# Patient Record
Sex: FEMALE | Race: WHITE | NOT HISPANIC OR LATINO | Employment: OTHER | URBAN - METROPOLITAN AREA
[De-identification: names, ages, dates, MRNs, and addresses within clinical notes are randomized per-mention and may not be internally consistent; named-entity substitution may affect disease eponyms.]

---

## 2017-12-21 ENCOUNTER — ALLSCRIPTS OFFICE VISIT (OUTPATIENT)
Dept: OTHER | Facility: OTHER | Age: 42
End: 2017-12-21

## 2017-12-21 ENCOUNTER — TRANSCRIBE ORDERS (OUTPATIENT)
Dept: MAMMOGRAPHY | Facility: CLINIC | Age: 42
End: 2017-12-21

## 2017-12-21 ENCOUNTER — HOSPITAL ENCOUNTER (OUTPATIENT)
Dept: MAMMOGRAPHY | Facility: CLINIC | Age: 42
Discharge: HOME | End: 2017-12-21
Payer: COMMERCIAL

## 2017-12-21 DIAGNOSIS — Z12.31 ENCOUNTER FOR SCREENING MAMMOGRAM FOR MALIGNANT NEOPLASM OF BREAST: ICD-10-CM

## 2017-12-21 PROCEDURE — 77063 BREAST TOMOSYNTHESIS BI: CPT

## 2017-12-21 PROCEDURE — G0202 SCR MAMMO BI INCL CAD: HCPCS

## 2017-12-26 ENCOUNTER — ALLSCRIPTS OFFICE VISIT (OUTPATIENT)
Dept: OTHER | Facility: OTHER | Age: 42
End: 2017-12-26

## 2017-12-26 ENCOUNTER — GENERIC CONVERSION - ENCOUNTER (OUTPATIENT)
Dept: OTHER | Facility: OTHER | Age: 42
End: 2017-12-26

## 2017-12-27 NOTE — PROGRESS NOTES
Assessment   1  Never a smoker   2  Acute upper respiratory infection (465 9) (J06 9)   3  Cough (786 2) (R05)    Plan   Acute upper respiratory infection    · Mucinex DM  MG Oral Tablet Extended Release 12 Hour; TAKE 1 TO 2    TABLETS EVERY 12 HOURS AS NEEDED FOR COUGH   · Saline Nasal Spray 0 65 % Nasal Solution; 2 spray each nostril twice a day   · PredniSONE 10 MG Oral Tablet; 4 pills at once daily with food for 2 days, 3 pills at    once daily for 2 days, 2 pills daily at once  for 2 days, one pill daily for 2 days  Cough    · PEAK FLOW- POC; Status:Resulted - Requires Verification,Retrospective By Protocol    Authorization;   Done: [id number] 04:05PM    Discussion/Summary      URI - start Mucinex and Prednisone taper and use saline nasal spray, f/u in the office if symptoms persist or worsen  Possible side effects of new medications were reviewed with the patient/guardian today  The treatment plan was reviewed with the patient/guardian  The patient/guardian understands and agrees with the treatment plan      Chief Complaint   Pt presents in the office today with c/o a cough and congestion times 5 days;   due 12/21/2018 due 06/27/2018      History of Present Illness   HPI: Pt presents with c/o nasal congestion, runny nose, postnasal drip, cough and chest congestion for 5 days, she is now having tightness in her chest with activity or coughing spells and has been using her Mother's rescue inhaler 1-2 times a day with good results  No fever or chills, no purulent mucus production, no pleuritic chest pain  Review of Systems        Constitutional: feeling tired, but-- no fever,-- not feeling poorly-- and-- no chills  ENT: nasal discharge, but-- no earache,-- no sore throat-- and-- no hoarseness  Cardiovascular: no complaints of slow or fast heart rate, no chest pain, no palpitations, no leg claudication or lower extremity edema  Respiratory: as noted in HPI        Gastrointestinal: no complaints of abdominal pain, no constipation, no nausea or diarrhea, no vomiting, no bloody stools  Active Problems   1  Acute upper respiratory infection (465 9) (J06 9)   2  Allergic rhinitis (477 9) (J30 9)   3  Bee sting allergy (V15 06) (Z91 038)   4  Blood tests for routine general physical examination (V72 62) (Z00 00)   5  Cough (786 2) (R05)   6  Encounter for routine gynecological examination (V72 31) (Z01 419)   7  Encounter for screening for other disorder (V82 89) (Z13 89)   8  Encounter for screening mammogram for malignant neoplasm of breast (V76 12)     (Z12 31)   9  Need for prophylactic vaccination and inoculation against influenza (V04 81) (Z23)   10  Need for vaccination for DTP (V06 1) (Z23)   11  Pap smear abnormality of cervix with ASCUS favoring benign (795 01) (R87 610)   12  Thyroid nodule (241 0) (E04 1)   13  Tick bite (919 4,E906 4) (W57 XXXA)   14  Vitamin D deficiency (268 9) (E55 9)    Past Medical History   1  History of Contact dermatitis due to poison ivy (692 6) (L23 7)   2  History of Dermatitis Due To Contact With Poison [place] (692 6)   3  History of acute bronchitis (V12 69) (Z87 09)   4  History of tonsillitis (V12 69) (Z87 09)    Family History   Mother    1  Denied: Family history of drug abuse   2  Family history of Healthy adult   3  Family history of Living and Healthy   4  Denied: Family history of Mental health problem  Father    5  Denied: Family history of drug abuse   6  FHx: Parkinson's disease (V17 2) (Z82 0)   7  Family history of Living and Healthy   8  Denied: Family history of Mental health problem  Maternal Grandfather    9  FHx: Parkinson's disease (V17 2) (Z82 0)   10  Family history of Healthy adult  Family History Reviewed: The family history was reviewed and updated today         Social History    · Denied: History of Alcohol use   · Always uses seat belt   · Caffeine use (V49 89) (F15 90)   · 1 cup coffee   · Daily caffeine consumption, 1 serving a day   · Has smoke detectors   · Never a smoker   · No alcohol use   · No drug use  The social history was reviewed and updated today  Surgical History   1  History of Arm Incision    Current Meds    1  EpiPen 2-Richardson 0 3 MG/0 3ML Injection Solution Auto-injector; Therapy: [id number] to Recorded   2  [address] [place] [address]; Therapy: (Recorded:12Nwy2658) to Recorded    Allergies   1  No Known Drug Allergies  2  Other    Vitals    Recorded: 34VJC3609 04:01PM   Temperature 98 F   Heart Rate 64   Respiration 14   Systolic 751   Diastolic 64   Height 5 ft 5 in   Weight 146 lb 3 2 oz   BMI Calculated 24 33   BSA Calculated 1 73     Physical Exam        Constitutional      General appearance: No acute distress, well appearing and well nourished  Ears, Nose, Mouth, and Throat      Otoscopic examination: Tympanic membranes translucent with normal light reflex  Canals patent without erythema  Nasal mucosa, septum, and turbinates: Abnormal   no nasal discharge  The bilateral nasal mucosa was edematous-- and-- red  Oropharynx: Normal with no erythema, edema, exudate or lesions  Pulmonary      Respiratory effort: No increased work of breathing or signs of respiratory distress  Auscultation of lungs: Abnormal   no rales or crackles were heard bilaterally  rhonchi over both apices-- and-- cleared with cough  no wheezing  Cardiovascular      Auscultation of heart: Normal rate and rhythm, normal S1 and S2, without murmurs  Lymphatic      Palpation of lymph nodes in neck: No lymphadenopathy            Results/Data   PEAK FLOW- POC [id number] 04:05PM Kym[facility]      Test Name Result Flag Reference   Peak Flow [phone]                      Signatures    Electronically signed by : TODD Parikh ; Dec 26 2017  6:55PM EST                       (Author)

## 2017-12-29 LAB
ADEQUACY: (HISTORICAL): NORMAL
CLINICIAN PROVIDIED ICD 9 OR 10 (HISTORICAL): NORMAL
COMMENT (HISTORICAL): NORMAL
DIAGNOSIS (HISTORICAL): NORMAL
PERFORMED BY (HISTORICAL): NORMAL
REFLEX (HISTORICAL): NORMAL

## 2018-01-09 NOTE — PROGRESS NOTES
Assessment    1  Encounter for routine gynecological examination (V72 31) (Z01 419)    Plan  Encounter for routine gynecological examination    · (1) THIN PREP PAP WITH IMAGING; Status: In Progress - Specimen/Data Collected;    Done: 17SKW7106  Maturation index required? : No  : 06/20/2016  HPV? : if ASCUS   · Follow Up in 2 Years Evaluation and Treatment  Follow-up  Status: Complete  Done:  39JQY1688  Tick bite    · (1) LYME ANTIBODY, WESTERN BLOT; Status:Canceled;            A/P  1  gyn:  we have done your pap and we will put a card in the mail with the results  rto 2 years or as needed    Mammogram is due after 08/04/2015  and script was given  Pap test is due after 08/04/2016 and she has a new insurance plan  Chief Complaint  Patient presents to the office today for annual GYN  Mammogram is due after 08/04/2015  Pap test is due after 08/04/2016 and she has a new insurance plan  History of Present Illness  HM, Adult Female: The patient is being seen for a gynecology evaluation  General Health:   Reproductive health:  she reports normal menses  Menstrual history:  age at menarche was 13years old  LMP: the last menstrual period was 06/20/2016  Recent menstrual periods: bleeding has been normal  The cycles have been regular  The duration of her recent periods has been regular  Screening:   HPI: Here today for annual pap  menses are normal no issues  mammo i is due and script was given         Review of Systems    Constitutional: No fever, no chills, feels well, no tiredness, no recent weight gain or weight loss  Cardiovascular: No complaints of slow heart rate, no fast heart rate, no chest pain, no palpitations, no leg claudication, no lower extremity edema  Respiratory: No complaints of shortness of breath, no wheezing, no cough, no SOB on exertion, no orthopnea, no PND     Gastrointestinal: No complaints of abdominal pain, no constipation, no nausea or vomiting, no diarrhea, no bloody stools  Genitourinary: No complaints of dysuria, no incontinence, no pelvic pain, no dysmenorrhea, no vaginal discharge or bleeding  Musculoskeletal: No complaints of arthralgias, no myalgias, no joint swelling or stiffness, no limb pain or swelling  Integumentary: No complaints of skin rash or lesions, no itching, no skin wounds, no breast pain or lump  Active Problems    1  Allergic reaction (995 3) (T78 40XA)   2  Allergic rhinitis (477 9) (J30 9)   3  Bee sting allergy (V15 06) (Z91 038)   4  Blood tests for routine general physical examination (V72 62) (Z00 00)   5  Encounter for routine gynecological examination (V72 31) (Z01 419)   6  Encounter for screening mammogram for malignant neoplasm of breast (V76 12)   (Z12 31)   7  Need for prophylactic vaccination and inoculation against influenza (V04 81) (Z23)   8  Need for vaccination for DTP (V06 1) (Z23)   9  Pap smear abnormality of cervix with ASCUS favoring benign (795 01) (R87 610)   10  Tick bite (919 4,E906 4) (W57 XXXA)   11   Vitamin D deficiency (268 9) (E55 9)    Past Medical History    · History of Contact dermatitis due to poison ivy (692 6) (L23 7)   · History of Dermatitis Due To Contact With Poison [place] (692 6)   · History of acute bronchitis (V12 69) (Z87 09)   · History of tonsillitis (V12 69) (Z87 09)   · History of Sore throat (462) (J02 9)    Surgical History    · History of Arm Incision    Family History  Mother    · Family history of Healthy adult   · Family history of Living and Healthy  Father    · FHx: Parkinson's disease (V17 2) (Z82 0)   · Family history of Living and Healthy  Maternal Grandfather    · FHx: Parkinson's disease (V17 2) (Z82 0)   · Family history of Healthy adult    Social History    · Denied: History of Alcohol use   · Always uses seat belt   · Caffeine use (V49 89) (F15 90)   · Daily caffeine consumption, 1 serving a day   · Has smoke detectors   · Never a smoker   · No alcohol use   · No drug use    Current Meds   1  EpiPen 2-Richardson 0 3 MG/0 3ML Injection Solution Auto-injector; Therapy: 11KID1782 to Recorded   2  [address] [facility]; Therapy: (Recorded:30Oct2015) to Recorded    Allergies    1  No Known Drug Allergies    2  Other    Vitals   Recorded: 73CSK6707 01:48PM   Heart Rate 64   Respiration 14   Systolic 991   Diastolic 62   Height 5 ft 5 in   Weight 137 lb    BMI Calculated 22 8   BSA Calculated 1 69     Physical Exam    Constitutional   General appearance: No acute distress, well appearing and well nourished  Neck   Neck: Supple, symmetric, trachea midline, no masses  Pulmonary   Auscultation of lungs: Clear to auscultation  Cardiovascular   Auscultation of heart: Normal rate and rhythm, normal S1 and S2, no murmurs  Chest   Breasts: Normal, no dimpling or skin changes appreciated  Palpation of breasts and axillae: Normal, no masses palpated  Abdomen   Abdomen: Non-tender, no masses  Liver and spleen: No hepatomegaly or splenomegaly  Genitourinary   External genitalia and vagina: Normal, no lesions appreciated  Cervix: Normal, no lesions, Pap obtained  Uterus: Normal size, no tenderness, no masses  Adnexa/Parametria: Normal, no masses or tenderness  Lymphatic   Palpation of lymph nodes in neck: No lymphadenopathy  Musculoskeletal   Range of motion: Normal     Skin   Skin and subcutaneous tissue: Normal without rashes or lesions  Psychiatric   Orientation to person, place, and time: Normal     Mood and affect: Normal        Health Management  Encounter for routine gynecological examination   (every) THINPREP PAP AND HR HPV DNA; every 2 years; Last 04Aug2014; Next Due:  78Koh9078; Near Due  Encounter for screening mammogram for malignant neoplasm of breast   Digital Bilateral Screening Mammogram With CAD; every 1 year; Last 04Aug2014; Next  Due: 66Kbp7947;  Overdue    Signatures   Electronically signed by : HUSSAIN Valverde; Jun 27 2016  2:29PM EST (Author)    Electronically signed by : TODD Ordonez ; Jun 27 2016  3:17PM EST                       (Author)

## 2018-01-23 VITALS
HEART RATE: 64 BPM | SYSTOLIC BLOOD PRESSURE: 104 MMHG | DIASTOLIC BLOOD PRESSURE: 64 MMHG | HEIGHT: 65 IN | RESPIRATION RATE: 14 BRPM | BODY MASS INDEX: 24.36 KG/M2 | WEIGHT: 146.2 LBS | TEMPERATURE: 98 F

## 2018-01-23 VITALS
SYSTOLIC BLOOD PRESSURE: 114 MMHG | DIASTOLIC BLOOD PRESSURE: 80 MMHG | BODY MASS INDEX: 23.16 KG/M2 | RESPIRATION RATE: 14 BRPM | WEIGHT: 139 LBS | HEART RATE: 66 BPM | HEIGHT: 65 IN

## 2018-01-23 NOTE — PROGRESS NOTES
Assessment    1  Encounter for routine gynecological examination (V72 31) (Z01 419)    Plan  Encounter for routine gynecological examination    · (1) THIN PREP PAP WITH IMAGING; Status: In Progress - Specimen/Data  Collected,Retrospective By Protocol Authorization;   Done: 21KFL8023  Maturation index required? : No  HPV? : if ASCUS  Encounter for screening mammogram for malignant neoplasm of breast    · * MAMMO SCREENING BILATERAL W CAD; Status:Active - Retrospective By Protocol  Authorization; Requested for:[id number];     a/p  1 GYN: WE HAVE DONE YOUR PAP TODAY AND WE WILL PUT A CARD IN THE  MAIL WITH THE RESULTS  YOU WILL BE DUE BACK AGAIN IN 2 YEARS    almita due - 08/2016   SCRIPT WAS GIVEN   pap due - 12/2019     Chief Complaint  pt  presents in the office today due to a pap    almita due - 08/2016  pap due - 06/27/2018      History of Present Illness  HM, Adult Female: The patient is being seen for a gynecology evaluation  General Health:   Reproductive health:  she reports abnormal menses  Menstrual history:  age at menarche was 13  LMP: it was of a normal amount and duration  Recent menstrual periods: bleeding has been normal  The cycles have been regular  The duration of her recent periods has been regular  Screening:   HPI: here today for annual pap      Review of Systems    Constitutional: No fever, no chills, feels well, no tiredness, no recent weight gain or weight loss  Cardiovascular: No complaints of slow heart rate, no fast heart rate, no chest pain, no palpitations, no leg claudication, no lower extremity edema  Respiratory: No complaints of shortness of breath, no wheezing, no cough, no SOB on exertion, no orthopnea, no PND  Gastrointestinal: No complaints of abdominal pain, no constipation, no nausea or vomiting, no diarrhea, no bloody stools  Genitourinary: No complaints of dysuria, no incontinence, no pelvic pain, no dysmenorrhea, no vaginal discharge or bleeding     Neurological: No complaints of headache, no confusion, no convulsions, no numbness, no dizziness or fainting, no tingling, no limb weakness, no difficulty walking  Psychiatric: Not suicidal, no sleep disturbance, no anxiety or depression, no change in personality, no emotional problems  Endocrine: No complaints of proptosis, no hot flashes, no muscle weakness, no deepening of the voice, no feelings of weakness  Active Problems    1  Allergic rhinitis (477 9) (J30 9)   2  Bee sting allergy (V15 06) (Z91 038)   3  Blood tests for routine general physical examination (V72 62) (Z00 00)   4  Encounter for routine gynecological examination (V72 31) (Z01 419)   5  Encounter for screening for other disorder (V82 89) (Z13 89)   6  Encounter for screening mammogram for malignant neoplasm of breast (V76 12)   (Z12 31)   7  Need for prophylactic vaccination and inoculation against influenza (V04 81) (Z23)   8  Need for vaccination for DTP (V06 1) (Z23)   9  Pap smear abnormality of cervix with ASCUS favoring benign (795 01) (R87 610)   10  Thyroid nodule (241 0) (E04 1)   11  Tick bite (919 4,E906 4) (W57 XXXA)   12   Vitamin D deficiency (268 9) (E55 9)    Past Medical History    · History of Contact dermatitis due to poison ivy (692 6) (L23 7)   · History of Dermatitis Due To Contact With Poison [place] (692 6)   · History of acute bronchitis (V12 69) (Z87 09)   · History of tonsillitis (V12 69) (Z87 09)    Surgical History    · History of Arm Incision    Family History  Mother    · Denied: Family history of drug abuse   · Family history of Healthy adult   · Family history of Living and Healthy   · Denied: Family history of Mental health problem  Father    · Denied: Family history of drug abuse   · FHx: Parkinson's disease (V17 2) (Z82 0)   · Family history of Living and Healthy   · Denied: Family history of Mental health problem  Maternal Grandfather    · FHx: Parkinson's disease (V17 2) (Z82 0)   · Family history of Healthy adult    Social History    · Denied: History of Alcohol use   · Always uses seat belt   · Caffeine use (V49 89) (F15 90)   · 1 cup coffee   · Daily caffeine consumption, 1 serving a day   · Has smoke detectors   · Never a smoker   · No alcohol use   · No drug use    Current Meds   1  EpiPen 2-Richardson 0 3 MG/0 3ML Injection Solution Auto-injector; Therapy: 01XCT2064 to Recorded   2  [address] [facility]; Therapy: (Recorded:30Oct2015) to Recorded    Allergies    1  No Known Drug Allergies    2  Other    Vitals   Recorded: 21Dec2017 11:31AM   Heart Rate 66   Respiration 14   Systolic 702   Diastolic 80   Height 5 ft 5 in   Weight 139 lb    BMI Calculated 23 13   BSA Calculated 1 69     Physical Exam    Constitutional   General appearance: No acute distress, well appearing and well nourished  Neck   Neck: Supple, symmetric, trachea midline, no masses  Thyroid: Normal, no thyromegaly  Pulmonary   Auscultation of lungs: Clear to auscultation  Cardiovascular   Auscultation of heart: Normal rate and rhythm, normal S1 and S2, no murmurs  Carotid pulses: 2+ bilaterally  Chest   Breasts: Normal, no dimpling or skin changes appreciated  Palpation of breasts and axillae: Normal, no masses palpated  Abdomen   Abdomen: Non-tender, no masses  Liver and spleen: No hepatomegaly or splenomegaly  Genitourinary   External genitalia and vagina: Normal, no lesions appreciated  Cervix: Normal, no lesions, Pap obtained  Examination of the cervix revealed normal findings  A Pap smear was performed  Uterus: Normal size, no tenderness, no masses  Adnexa/Parametria: Normal, no masses or tenderness  Lymphatic   Palpation of lymph nodes in neck: No lymphadenopathy  Skin   Skin and subcutaneous tissue: Normal without rashes or lesions      Psychiatric   Orientation to person, place, and time: Normal     Mood and affect: Normal        Results/Data  PHQ-2 Adult Depression Screening 84Xgs3025 11:33AM User, s Test Name Result Flag Reference   PHQ-2 Adult Depression Score 0     Over the last two weeks, how often have you been bothered by any of the following problems?   Little interest or pleasure in doing things: Not at all - 0  Feeling down, depressed, or hopeless: Not at all - 0   PHQ-2 Adult Depression Screening Negative         Signatures   Electronically signed by : HUSSAIN Britton; Dec 21 2017 12:24PM EST                       (Author)    Electronically signed by : Joshua Davies DO; Dec 22 2017  8:22AM EST

## 2018-04-02 ENCOUNTER — OFFICE VISIT (OUTPATIENT)
Dept: FAMILY MEDICINE CLINIC | Facility: CLINIC | Age: 43
End: 2018-04-02
Payer: COMMERCIAL

## 2018-04-02 VITALS
SYSTOLIC BLOOD PRESSURE: 120 MMHG | RESPIRATION RATE: 12 BRPM | DIASTOLIC BLOOD PRESSURE: 62 MMHG | BODY MASS INDEX: 23.82 KG/M2 | HEIGHT: 65 IN | HEART RATE: 88 BPM | WEIGHT: 143 LBS

## 2018-04-02 DIAGNOSIS — G44.029 CHRONIC CLUSTER HEADACHE, NOT INTRACTABLE: Primary | ICD-10-CM

## 2018-04-02 DIAGNOSIS — R51.9 HEADACHE DISORDER: ICD-10-CM

## 2018-04-02 PROBLEM — Z00.00 HEALTHCARE MAINTENANCE: Status: ACTIVE | Noted: 2018-04-02

## 2018-04-02 PROCEDURE — 99214 OFFICE O/P EST MOD 30 MIN: CPT | Performed by: NURSE PRACTITIONER

## 2018-04-02 PROCEDURE — 3008F BODY MASS INDEX DOCD: CPT | Performed by: NURSE PRACTITIONER

## 2018-04-02 RX ORDER — EPINEPHRINE 0.3 MG/.3ML
INJECTION SUBCUTANEOUS
COMMUNITY
Start: 2014-08-17

## 2018-04-02 RX ORDER — ECHINACEA PURPUREA EXTRACT 125 MG
2 TABLET ORAL 2 TIMES DAILY
COMMUNITY
Start: 2017-12-26 | End: 2018-04-02 | Stop reason: ALTCHOICE

## 2018-04-02 RX ORDER — BIOTIN 5 MG
TABLET ORAL
COMMUNITY

## 2018-04-02 NOTE — PROGRESS NOTES
Chief Complaint   Patient presents with    Migraine     Assessment/Plan:      1  Chronic cluster headache, not intractable  I have ordered labs and a ct of your head  I will call you with the results  If these are normal I will refer you to neuro for evaluation  Please wait for insurance  approval prior to scheduling the CT scan  - CT head wo contrast; Future  - TSH, 3rd generation with T4 reflex; Future  - Comprehensive metabolic panel; Future  - CBC and differential; Future  - Lyme disease, western blot; Future          Subjective:      Patient ID: John Sutton is a 38 y o  female  Here today with concern regarding migraines  / father and sister both get migraines/ pt states she got her first one about one year ago after a spinning class  Does not get them very often but seem to occur if she is dehydrated Does not always get a headache but will get other symptoms like her face feels swollen, gets brain fog , feels things moving around in her head  Feels off  Feels that she looks pale  Has gotten dizzy with this  over the past couple days has been getting this every day  Yesterday was feeling like this was happening again and was feeling dizzy so she hate some fruit and this seemed to make her feel better  The headache part is a minor part and rare part of what she is calling a migraine  Once ws in a specific par to f her head  And was more like sharp but not throbbing  Ws more pressure  Other symptoms are " brain fog":   Can not concentrate  Gets sensitivity to light and then gets patches or blurryness  In her vision that is defiantly on the R but not sure on the L eye  In the last week has been getting this every day  Has gotten the visual disturbance almost every day in the past week           The following portions of the patient's history were reviewed and updated as appropriate: allergies, current medications, past family history, past medical history, past social history, past surgical history and problem list     Past Medical History:   Diagnosis Date    Broken arm     Migraine      History reviewed  No pertinent surgical history  Family History   Problem Relation Age of Onset    No Known Problems Mother     Parkinsonism Father     Migraines Father     Migraines Sister     Substance Abuse Neg Hx     Mental illness Neg Hx      Social History   Social History     Social History    Marital status: Single     Spouse name: N/A    Number of children: N/A    Years of education: N/A     Occupational History    Not on file  Social History Main Topics    Smoking status: Never Smoker    Smokeless tobacco: Never Used    Alcohol use No    Drug use: No    Sexual activity: Not on file     Other Topics Concern    Not on file     Social History Narrative    No narrative on file     Review of Systems   Constitutional: Negative  HENT: Negative  Eyes: Positive for photophobia and visual disturbance  Respiratory: Negative  Cardiovascular: Negative  Musculoskeletal: Negative  Skin: Negative  Neurological: Positive for dizziness and headaches  Psychiatric/Behavioral: Negative  Vitals:    04/02/18 1534   BP: 120/62   BP Location: Left arm   Patient Position: Sitting   Pulse: 88   Resp: 12   Weight: 64 9 kg (143 lb)   Height: 5' 5" (1 651 m)       Objective:  Generic Conversion - Encounter on 12/26/2017   Component Date Value Ref Range Status    DIAGNOSIS (HISTORICAL) 12/26/2017 NEGATIVE FOR INTRAEPITHELIAL LESION AND MALIGNANCY  THE CYTOLOGY PROCESSING WAS PERFORMED AT THE [facility] FACILITY LOCATED  [facility], [address], [address] [place] [phone]  Final    Comment: Result Comment: NEGATIVE FOR INTRAEPITHELIAL LESION AND MALIGNANCY  THE CYTOLOGY PROCESSING WAS PERFORMED AT THE [facility] FACILITY LOCATED AT  [facility], [address], [address] [place] [phone]   Adequacy: (HISTORICAL) 12/26/2017 Satisfactory for evaluation    Endocervical and/or squamous metaplasticcells (endocervical component) are present  Final    Comment: Result Comment: Satisfactory for evaluation  Endocervical and/or squamous metaplastic  cells (endocervical component) are present   CLINICIAN PROVIDIED ICD 9 OR 10 (H* 12/26/2017 Z01 419   Final    PERFORMED BY (HISTORICAL) 12/26/2017 Elba Hernandez Cytotechnologist (ASCP)   Final    Comment 12/26/2017   Final    NOTE: 12/26/2017    Final                    Value: The Pap smear is a screening test designed to aid in the detection ofpremalignant and malignant conditions of the uterine cervix  It is not adiagnostic procedure and should not be used as the sole means of detectingcervical cancer  Both false-positive   and false-negative reports do occur   Reflex (HISTORICAL) 12/26/2017 The HPV DNA reflex criteria were not met with this specimen resulttherefore, no HPV testing was performed  Final    Comment: Result Comment: The HPV DNA reflex criteria were not met with this specimen result  therefore, no HPV testing was performed  Performed at: [address] [place] [address],[address], [address], [id number], [id number]  MD:  [facility] [phone]     Generic Conversion - Encounter on 08/23/2016   Component Date Value Ref Range Status    CULTURE RESULT 08/23/2016 Final report   Final    Miscellaneous Lab Test Result 08/23/2016 Comment   Final    Comment: Result Comment: Routine respiratory pancho  Please indicate source of specimen on all future request forms  Performed at: [facility] , [place], [place], [id number], [id number]  MD:  [address] [place] [phone]     Generic Conversion - Encounter on 06/27/2016   Component Date Value Ref Range Status    DIAGNOSIS (HISTORICAL) 06/27/2016 Comment   Final    Comment: Result Comment: NEGATIVE FOR INTRAEPITHELIAL LESION AND MALIGNANCY  REACTIVE CELLULAR CHANGES AND/OR REPAIR ARE PRESENT    THE CYTOLOGY PROCESSING WAS PERFORMED AT THE [facility]AC[facility] LOCATED AT  [facility], [address], [address] [place], [place] [id number]   RECOMMENDATION (HISTORICAL) 06/27/2016 Comment   Final    Adequacy: (HISTORICAL) 06/27/2016 Comment   Final    Comment: Result Comment: Satisfactory for evaluation  Endocervical and/or squamous metaplastic  cells (endocervical component) are present   CLINICIAN PROVIDIED ICD 9 OR 10 (H* 06/27/2016 Comment   Final    PERFORMED BY (HISTORICAL) 06/27/2016 Comment   Final    ELECTRONICALLY SIGNED BY (HISTORIC* 06/27/2016 Comment   Final    Comment 06/27/2016   Final    NOTE: 06/27/2016 Comment   Final    Comment: Result Comment: The Pap smear is a screening test designed to aid in the detection of  premalignant and malignant conditions of the uterine cervix  It is not a  diagnostic procedure and should not be used as the sole means of detecting  cervical cancer  Both false-positive and false-negative reports do occur   Reflex (HISTORICAL) 06/27/2016 Comment   Final    Comment: Result Comment: The HPV DNA reflex criteria were not met with this specimen result  therefore, no HPV testing was performed  Performed at: [address] [place] [address],[address], [address], [id number], [id number]  MD:  [address] [facility] [phone]            Physical Exam   Constitutional: She is oriented to person, place, and time  She appears well-developed and well-nourished  HENT:   Head: Normocephalic and atraumatic  Left Ear: External ear normal    Eyes: Conjunctivae and EOM are normal  Pupils are equal, round, and reactive to light  Neck: Normal range of motion  Neck supple  No thyromegaly present  Cardiovascular: Normal rate, normal heart sounds and intact distal pulses  Pulmonary/Chest: Effort normal and breath sounds normal    Musculoskeletal: Normal range of motion  Neurological: She is alert and oriented to person, place, and time  She has normal reflexes  She displays normal reflexes  No cranial nerve deficit  Coordination normal    Good tandem gait, neg rhomberg neg drift   Skin: Skin is warm and dry  Psychiatric: She has a normal mood and affect   Her behavior is normal  Judgment and thought content normal

## 2018-04-03 ENCOUNTER — APPOINTMENT (EMERGENCY)
Dept: RADIOLOGY | Facility: HOSPITAL | Age: 43
End: 2018-04-03
Payer: COMMERCIAL

## 2018-04-03 ENCOUNTER — HOSPITAL ENCOUNTER (EMERGENCY)
Facility: HOSPITAL | Age: 43
Discharge: HOME | End: 2018-04-03
Attending: EMERGENCY MEDICINE | Admitting: EMERGENCY MEDICINE
Payer: COMMERCIAL

## 2018-04-03 VITALS
OXYGEN SATURATION: 100 % | DIASTOLIC BLOOD PRESSURE: 80 MMHG | HEIGHT: 65 IN | WEIGHT: 141 LBS | HEART RATE: 70 BPM | SYSTOLIC BLOOD PRESSURE: 124 MMHG | TEMPERATURE: 97.7 F | RESPIRATION RATE: 18 BRPM | BODY MASS INDEX: 23.49 KG/M2

## 2018-04-03 DIAGNOSIS — G43.909 MIGRAINE: Primary | ICD-10-CM

## 2018-04-03 PROCEDURE — 70450 CT HEAD/BRAIN W/O DYE: CPT

## 2018-04-03 PROCEDURE — 99283 EMERGENCY DEPT VISIT LOW MDM: CPT

## 2018-04-03 RX ORDER — MECLIZINE HCL 12.5 MG/1
12.5 TABLET ORAL 3 TIMES DAILY PRN
Qty: 30 TABLET | Refills: 0 | Status: SHIPPED | OUTPATIENT
Start: 2018-04-03 | End: 2018-12-27 | Stop reason: ALTCHOICE

## 2018-04-03 NOTE — DISCHARGE INSTRUCTIONS
Migraine Headache   WHAT YOU SHOULD KNOW:   A migraine is a severe headache  The pain can be so severe that it interferes with your daily activities  A migraine can last a few hours up to several days  The exact cause of migraines is not known  It may be caused by changes in your body chemicals and extra sensitive nerves in your brain  AFTER YOU LEAVE:   Medicines:  Take medicine as soon as you feel a migraine begin  · Pain medicine: You may need medicine to take away or decrease pain  You may need a doctor's order for this medicine  Do not wait until the pain is severe before you take your medicine  · Migraine medicines: These are used to help prevent a migraine or stop it once it starts  · Antinausea medicine: This medicine may be given to calm your stomach and to help prevent vomiting  They can also help relieve pain  · Take your medicine as directed  Call your healthcare provider if you think your medicine is not helping or if you have side effects  Tell him if you are allergic to any medicine  Keep a list of the medicines, vitamins, and herbs you take  Include the amounts, and when and why you take them  Bring the list or the pill bottles to follow-up visits  Carry your medicine list with you in case of an emergency  Manage your symptoms:   · Rest:  Rest in a dark, quiet room  This will help decrease your pain  · Ice:  Ice helps decrease pain  Use an ice pack or put crushed ice in a plastic bag  Cover the ice pack with a towel and place it on your head where it hurts for 15 to 20 minutes every hour  · Heat:  Heat helps decrease pain and muscle spasms  Use a small towel dampened with warm water or a heating pad, or sit in a warm bath  Apply heat on the area for 20 to 30 minutes every 2 hours  You may alternate heat and ice  Keep a headache diary:  Write down when your migraines start and stop  Include your symptoms and what you were doing when a migraine began   Record what you ate or drank for 24 hours before the migraine started  Describe the pain and where it hurts  Keep track of what you did to treat your migraine and whether it worked  Follow up with your primary healthcare provider or neurologist as directed:  Bring your headache diary with you when you see your primary healthcare provider  Write down your questions so you remember to ask them during your visits  Prevent another migraine:   · Do not smoke: If you smoke, it is never too late to quit  Tobacco smoke can trigger a migraine  It can also cause heart disease, lung disease, cancer, and other health problems  Quitting smoking will improve your health and the health of those around you  If you smoke, ask for information about how to stop  · Do not drink alcohol:  Alcohol can trigger a migraine  It can also interfere with the medicines used to treat your migraine  · Get regular exercise:  Exercise may help prevent migraines  Talk to your primary healthcare provider about the best exercise plan for you  · Manage stress:  Stress may trigger a migraine  Learn new ways to relax, such as deep breathing  · Stick to a sleep schedule:  Go to bed and get up at the same time each day  · Eat regular meals:  Include healthy foods such as include fruit, vegetables, whole-grain breads, low-fat dairy products, beans, lean meat, and fish  Avoid trigger foods like chocolate, hard cheese, and red wine  Foods that contain gluten, nitrates, MSG, or artificial sweeteners may also trigger migraines  Caffeine, which is often used to treat migraines, can also trigger them  Contact your primary healthcare provider or neurologist if:   · You have a fever  · Your migraines interfere with your daily activities  · Your medicines or treatments stop working  · You have questions about your condition or care    Seek care immediately or call 911 if:   · You have a headache that seems different or much worse than your usual migraine headache  · You have a severe headache with a fever or a stiff neck  · You have new problems with speech, vision, balance, or movement  · You feel like you are going to faint, you become confused, or you have a seizure  © 2014 [address] is for End User's use only and may not be sold, redistributed or otherwise used for commercial purposes  All illustrations and images included in CareNotes® are the copyrighted property of A D A M , Inc  or Juan Michel  The above information is an  only  It is not intended as medical advice for individual conditions or treatments  Talk to your doctor, nurse or pharmacist before following any medical regimen to see if it is safe and effective for you

## 2018-04-03 NOTE — ED PROVIDER NOTES
History  Chief Complaint   Patient presents with    Headache     Patient states that she had a migraine on Tuesday and since has been having dizziness and today she has pressure to her right side of her face  Was told by PCP to come to be evaluated  Had blood work approx 1 hours  Was suppose to have CT but need authorization  76-year-old female presenting to the ER today with a chief complaint of headache  Patient states for the past year she has had intermittent migraine headaches  Symptoms include an aura and some degree of confusion  Has seen her primary care doctor for this repeatedly  A week ago patient had a typical migraine but the symptoms were slightly more severe  Patient went to her primary care doctor again and have blood work and a CAT scan ordered  Symptoms have persisted and patient does have a CT scan scheduled for Thursday but when continued came to the ER to be evaluated  History provided by:  Patient   used: No    Headache   Pain location:  Generalized  Quality:  Dull  Radiates to:  Does not radiate  Timing:  Constant  Progression:  Unchanged  Chronicity:  Recurrent  Similar to prior headaches: yes    Worsened by:  Nothing  Associated symptoms: no abdominal pain, no diarrhea, no fatigue, no fever, no nausea and no vomiting        Prior to Admission Medications   Prescriptions Last Dose Informant Patient Reported? Taking? EPINEPHrine (EPIPEN 2-CINDY) 0 3 mg/0 3 mL SOAJ   Yes Yes   Sig: Inject as directed   Krill Oil 1000 MG CAPS   Yes Yes   Sig: Take by mouth      Facility-Administered Medications: None       Past Medical History:   Diagnosis Date    Broken arm     Migraine        History reviewed  No pertinent surgical history      Family History   Problem Relation Age of Onset    No Known Problems Mother     Parkinsonism Father     Migraines Father     Migraines Sister     Substance Abuse Neg Hx     Mental illness Neg Hx      I have reviewed and agree with the history as documented  Social History   Substance Use Topics    Smoking status: Never Smoker    Smokeless tobacco: Never Used    Alcohol use No        Review of Systems   Constitutional: Negative  Negative for appetite change, chills, diaphoresis, fatigue and fever  HENT: Negative  Eyes: Negative  Respiratory: Negative  Cardiovascular: Negative  Gastrointestinal: Negative for abdominal pain, blood in stool, constipation, diarrhea, nausea and vomiting  Endocrine: Negative  Genitourinary: Negative for decreased urine volume, difficulty urinating, dyspareunia, dysuria, flank pain, frequency, hematuria, pelvic pain, urgency, vaginal bleeding, vaginal discharge and vaginal pain  Musculoskeletal: Negative  Skin: Negative  Allergic/Immunologic: Negative  Neurological: Positive for headaches  Psychiatric/Behavioral: Negative  All other systems reviewed and are negative  Physical Exam  ED Triage Vitals [04/03/18 1719]   Temperature Pulse Respirations Blood Pressure SpO2   97 7 °F (36 5 °C) 70 18 124/80 100 %      Temp Source Heart Rate Source Patient Position - Orthostatic VS BP Location FiO2 (%)   Oral -- Sitting Left arm --      Pain Score       No Pain           Orthostatic Vital Signs  Vitals:    04/03/18 1719   BP: 124/80   Pulse: 70   Patient Position - Orthostatic VS: Sitting       Physical Exam   Constitutional: She is oriented to person, place, and time  She appears well-developed and well-nourished  HENT:   Head: Normocephalic and atraumatic  Right Ear: External ear normal    Left Ear: External ear normal    Nose: Nose normal    Mouth/Throat: Oropharynx is clear and moist    Eyes: Conjunctivae and EOM are normal  Pupils are equal, round, and reactive to light  Neck: Normal range of motion  Neck supple  No JVD present  No tracheal deviation present  No thyromegaly present     Cardiovascular: Normal rate, regular rhythm, normal heart sounds and intact distal pulses  Exam reveals no gallop and no friction rub  No murmur heard  Pulmonary/Chest: Effort normal and breath sounds normal  No stridor  No respiratory distress  She has no wheezes  She has no rales  She exhibits no tenderness  Abdominal: Soft  Bowel sounds are normal  She exhibits no distension and no mass  There is no tenderness  There is no rebound and no guarding  No hernia  Musculoskeletal: Normal range of motion  She exhibits no edema, tenderness or deformity  Lymphadenopathy:     She has no cervical adenopathy  Neurological: She is alert and oriented to person, place, and time  She has normal reflexes  She displays normal reflexes  No cranial nerve deficit  She exhibits normal muscle tone  Coordination normal    Skin: Skin is warm  No rash noted  No erythema  No pallor  Psychiatric: She has a normal mood and affect  Her behavior is normal  Judgment and thought content normal    Nursing note and vitals reviewed  ED Medications  Medications - No data to display    Diagnostic Studies  Results Reviewed     None                 CT head without contrast   Final Result by Remy Montalvo MD (04/03 1819)      No acute intracranial abnormality                    Workstation performed: [id number]               Procedures  Procedures      Phone Consults  ED Phone Contact    ED Course  ED Course                                MDM  Number of Diagnoses or Management Options  Migraine: new and requires workup     Amount and/or Complexity of Data Reviewed  Clinical lab tests: ordered and reviewed  Tests in the radiology section of CPT®: reviewed and ordered  Tests in the medicine section of CPT®: reviewed and ordered  Decide to obtain previous medical records or to obtain history from someone other than the patient: yes  Independent visualization of images, tracings, or specimens: yes    Patient Progress  Patient progress: stable    CritCare Time    Disposition  Final diagnoses:   Migraine Time reflects when diagnosis was documented in both MDM as applicable and the Disposition within this note     Time User Action Codes Description Comment    4/3/2018  6:25 PM Xochitl Dominguez Add [T80 942] Migraine       ED Disposition     ED Disposition Condition Comment    Discharge  Ramy Castanon discharge to home/self care  Condition at discharge: Good        Follow-up Information     Follow up With Specialties Details Why Contact Info    Maricel Dyer, [address], Nurse Practitioner Schedule an appointment as soon as possible for a visit  [address] [address] [address]  [phone]          Discharge Medication List as of 4/3/2018  6:25 PM      CONTINUE these medications which have NOT CHANGED    Details   EPINEPHrine (EPIPEN 2-CINDY) 0 3 mg/0 3 mL SOAJ Inject as directed, Starting Sun 8/17/2014, Historical Med      Krill Oil 1000 MG CAPS Take by mouth, Historical Med           No discharge procedures on file  ED Provider  Attending physically available and evaluated Ramy Castanon  JOVAN managed the patient along with the ED Attending      Electronically Signed by         Edith Spicer DO  04/03/18 4887

## 2018-04-04 ENCOUNTER — TELEPHONE (OUTPATIENT)
Dept: FAMILY MEDICINE CLINIC | Facility: CLINIC | Age: 43
End: 2018-04-04

## 2018-04-04 NOTE — ED ATTENDING ATTESTATION
Andrea Sauer MD, saw and evaluated the patient  I have discussed the patient with the resident/non-physician practitioner and agree with the resident's/non-physician practitioner's findings, Plan of Care, and MDM as documented in the resident's/non-physician practitioner's note, except where noted  All available labs and Radiology studies were reviewed  At this point I agree with the current assessment done in the Emergency Department    I have conducted an independent evaluation of this patient a history and physical is as follows:   Pt has family history of migraines as well as sister who  of brain tumor Pt states she occasionally gets visual aura and brain fog followed by mild headache Started with thiw on Tuesday and ahs persisted Pt also has some feeling of being dizzy with unsteadiness Pt saw PMD and was recommended to have ct Pt today felt pain in r head and face go worse dizziness worse with movement PE: alert nad heart reg lungs clear abd soft nontender ent wnl neuro motor 5/5 senstion wnl cn intact dtrs 2+ f to n intact ambulates without difficulty MDM: will check ct will try antivert     Critical Care Time  CritCare Time    Procedures

## 2018-04-04 NOTE — TELEPHONE ENCOUNTER
Mat Gomes called to inform you that she went to the ER yesterday because of another migraine  She said they did the CT scan and bloodwork  She states that the CT scan was OK and is waiting to hear from you regarding the bloodwork

## 2018-04-05 LAB
ALBUMIN SERPL-MCNC: 4.4 G/DL (ref 3.5–5.5)
ALBUMIN/GLOB SERPL: 1.6 {RATIO} (ref 1.2–2.2)
ALP SERPL-CCNC: 62 IU/L (ref 39–117)
ALT SERPL-CCNC: 14 IU/L (ref 0–32)
AST SERPL-CCNC: 20 IU/L (ref 0–40)
B BURGDOR IGG PATRN SER IB-IMP: NEGATIVE
B BURGDOR IGM PATRN SER IB-IMP: NEGATIVE
B BURGDOR18KD IGG SER QL IB: ABNORMAL
B BURGDOR23KD IGG SER QL IB: ABNORMAL
B BURGDOR23KD IGM SER QL IB: ABNORMAL
B BURGDOR28KD IGG SER QL IB: ABNORMAL
B BURGDOR30KD IGG SER QL IB: ABNORMAL
B BURGDOR39KD IGG SER QL IB: ABNORMAL
B BURGDOR39KD IGM SER QL IB: ABNORMAL
B BURGDOR41KD IGG SER QL IB: PRESENT
B BURGDOR41KD IGM SER QL IB: ABNORMAL
B BURGDOR45KD IGG SER QL IB: ABNORMAL
B BURGDOR58KD IGG SER QL IB: ABNORMAL
B BURGDOR66KD IGG SER QL IB: ABNORMAL
B BURGDOR93KD IGG SER QL IB: ABNORMAL
BASOPHILS # BLD AUTO: 0 X10E3/UL (ref 0–0.2)
BASOPHILS NFR BLD AUTO: 0 %
BILIRUB SERPL-MCNC: <0.2 MG/DL (ref 0–1.2)
BUN SERPL-MCNC: 8 MG/DL (ref 6–24)
BUN/CREAT SERPL: 13 (ref 9–23)
CALCIUM SERPL-MCNC: 9.4 MG/DL (ref 8.7–10.2)
CHLORIDE SERPL-SCNC: 92 MMOL/L (ref 96–106)
CO2 SERPL-SCNC: 25 MMOL/L (ref 18–29)
CREAT SERPL-MCNC: 0.6 MG/DL (ref 0.57–1)
EOSINOPHIL # BLD AUTO: 0.1 X10E3/UL (ref 0–0.4)
EOSINOPHIL NFR BLD AUTO: 1 %
ERYTHROCYTE [DISTWIDTH] IN BLOOD BY AUTOMATED COUNT: 13.7 % (ref 12.3–15.4)
GLOBULIN SER-MCNC: 2.7 G/DL (ref 1.5–4.5)
GLUCOSE SERPL-MCNC: 68 MG/DL (ref 65–99)
HCT VFR BLD AUTO: 37.3 % (ref 34–46.6)
HGB BLD-MCNC: 12.7 G/DL (ref 11.1–15.9)
IMM GRANULOCYTES # BLD: 0 X10E3/UL (ref 0–0.1)
IMM GRANULOCYTES NFR BLD: 0 %
LYMPHOCYTES # BLD AUTO: 2.3 X10E3/UL (ref 0.7–3.1)
LYMPHOCYTES NFR BLD AUTO: 28 %
MCH RBC QN AUTO: 31.5 PG (ref 26.6–33)
MCHC RBC AUTO-ENTMCNC: 34 G/DL (ref 31.5–35.7)
MCV RBC AUTO: 93 FL (ref 79–97)
MONOCYTES # BLD AUTO: 0.5 X10E3/UL (ref 0.1–0.9)
MONOCYTES NFR BLD AUTO: 6 %
NEUTROPHILS # BLD AUTO: 5.3 X10E3/UL (ref 1.4–7)
NEUTROPHILS NFR BLD AUTO: 65 %
PLATELET # BLD AUTO: 308 X10E3/UL (ref 150–379)
POTASSIUM SERPL-SCNC: 4 MMOL/L (ref 3.5–5.2)
PROT SERPL-MCNC: 7.1 G/DL (ref 6–8.5)
RBC # BLD AUTO: 4.03 X10E6/UL (ref 3.77–5.28)
SL AMB EGFR AFRICAN AMERICAN: 128 ML/MIN/1.73
SL AMB EGFR NON AFRICAN AMERICAN: 111 ML/MIN/1.73
SL AMB T4, FREE (DIRECT): 1.48 NG/DL (ref 0.82–1.77)
SODIUM SERPL-SCNC: 132 MMOL/L (ref 134–144)
TSH SERPL DL<=0.005 MIU/L-ACNC: 4.69 UIU/ML (ref 0.45–4.5)
WBC # BLD AUTO: 8.1 X10E3/UL (ref 3.4–10.8)

## 2018-04-06 ENCOUNTER — TELEPHONE (OUTPATIENT)
Dept: FAMILY MEDICINE CLINIC | Facility: CLINIC | Age: 43
End: 2018-04-06

## 2018-04-06 DIAGNOSIS — E03.9 HYPOTHYROIDISM, UNSPECIFIED TYPE: Primary | ICD-10-CM

## 2018-04-06 NOTE — TELEPHONE ENCOUNTER
Pt  Wants to know about results for her lyme blood work and where to go for neuro     ----- Message from Maricel Dyer, [address] sent at 4/6/2018  1:27 PM EDT -----  Call pt: all labs are normal except the thyroid is a tad slow  Nothing to worry about   I will just armando this in 6 months

## 2018-04-06 NOTE — TELEPHONE ENCOUNTER
Lyme is neg    I would recommend [facility] as they are really good and she can get in there faster

## 2018-12-24 RX ORDER — LEVOTHYROXINE SODIUM 0.03 MG/1
25 TABLET ORAL
COMMUNITY
Start: 2018-04-30 | End: 2020-10-12 | Stop reason: ALTCHOICE

## 2018-12-27 ENCOUNTER — ANNUAL EXAM (OUTPATIENT)
Dept: FAMILY MEDICINE CLINIC | Facility: CLINIC | Age: 43
End: 2018-12-27
Payer: COMMERCIAL

## 2018-12-27 VITALS
DIASTOLIC BLOOD PRESSURE: 84 MMHG | BODY MASS INDEX: 23.66 KG/M2 | WEIGHT: 142 LBS | RESPIRATION RATE: 12 BRPM | HEART RATE: 72 BPM | HEIGHT: 65 IN | SYSTOLIC BLOOD PRESSURE: 120 MMHG

## 2018-12-27 DIAGNOSIS — Z12.39 SCREENING FOR MALIGNANT NEOPLASM OF BREAST: ICD-10-CM

## 2018-12-27 DIAGNOSIS — Z01.419 ENCOUNTER FOR GYNECOLOGICAL EXAMINATION WITH PAPANICOLAOU SMEAR OF CERVIX: Primary | ICD-10-CM

## 2018-12-27 PROBLEM — E03.9 ACQUIRED HYPOTHYROIDISM: Status: ACTIVE | Noted: 2018-04-30

## 2018-12-27 PROBLEM — E87.1 HYPONATREMIA: Status: ACTIVE | Noted: 2018-04-30

## 2018-12-27 PROCEDURE — 99396 PREV VISIT EST AGE 40-64: CPT | Performed by: NURSE PRACTITIONER

## 2018-12-27 NOTE — PROGRESS NOTES
Americo Dc is a 37 y o  female here for a routine gynecologic exam       Current complaints:   none     Gynecologic History  Patient's last menstrual period was 12/23/2018 (exact date)  Contraception: none  Last Pap: 2017  Results were: normal  Last mammogram: 2017  Results were: normal      Obstetric History  OB History        The following portions of the patient's history were reviewed and updated as appropriate: allergies, current medications, past family history, past medical history, past social history, past surgical history and problem list     Review of Systems   Constitutional: Negative  HENT: Negative  Eyes: Negative  Respiratory: Negative  Cardiovascular: Negative  Gastrointestinal: Negative  Endocrine: Negative  Genitourinary: Negative  Musculoskeletal: Negative  Skin: Negative  Allergic/Immunologic: Negative  Neurological: Negative  Hematological: Negative  Psychiatric/Behavioral: Negative  Objective    Vitals:    12/27/18 0832   BP: 120/84   BP Location: Left arm   Patient Position: Sitting   Pulse: 72   Resp: 12   Weight: 64 4 kg (142 lb)   Height: 5' 5" (1 651 m)       Physical Exam   Constitutional: She is oriented to person, place, and time  She appears well-developed and well-nourished  HENT:   Head: Normocephalic  Eyes: Pupils are equal, round, and reactive to light  Neck: Normal range of motion  Neck supple  Cardiovascular: Normal rate and regular rhythm  Pulmonary/Chest: Effort normal and breath sounds normal    Abdominal: Soft  Bowel sounds are normal    Genitourinary: Vagina normal and uterus normal  There is no rash, tenderness or lesion on the right labia  There is no rash, tenderness or lesion on the left labia  Cervix exhibits no discharge and no friability  Right adnexum displays no mass, no tenderness and no fullness  Left adnexum displays no mass, no tenderness and no fullness     Genitourinary Comments: Pap done   Musculoskeletal: Normal range of motion  Neurological: She is alert and oriented to person, place, and time  Skin: Skin is warm  Psychiatric: She has a normal mood and affect   Her behavior is normal  Judgment and thought content normal        Assessment     Healthy female exam       Plan  Well gyn /  Your pap was done and we will put a card in the mail with the results  You are due for your pap and have the order to get this done

## 2019-02-18 ENCOUNTER — OFFICE VISIT (OUTPATIENT)
Dept: FAMILY MEDICINE CLINIC | Facility: CLINIC | Age: 44
End: 2019-02-18

## 2019-02-18 VITALS
RESPIRATION RATE: 12 BRPM | BODY MASS INDEX: 23.49 KG/M2 | DIASTOLIC BLOOD PRESSURE: 60 MMHG | HEART RATE: 64 BPM | SYSTOLIC BLOOD PRESSURE: 112 MMHG | WEIGHT: 141 LBS | HEIGHT: 65 IN

## 2019-02-18 DIAGNOSIS — R87.615 ENCOUNTER FOR REPEAT PAP SMEAR DUE TO PREVIOUS INSUFF CERVICAL CELLS: Primary | ICD-10-CM

## 2019-02-18 DIAGNOSIS — Z01.419 ENCOUNTER FOR ROUTINE GYNECOLOGICAL EXAMINATION WITH PAPANICOLAOU SMEAR OF CERVIX: ICD-10-CM

## 2019-02-18 NOTE — PROGRESS NOTES
Here today for repap for insuff cells on recent pap  The repap was done with no difficulty     A card will be sent to the pt with the results

## 2019-02-21 LAB
CYTOLOGIST CVX/VAG CYTO: NORMAL
DX ICD CODE: NORMAL
LABCORP COMMENT: NORMAL
OTHER STN SPEC: NORMAL
OTHER STN SPEC: NORMAL
PATH REPORT.FINAL DX SPEC: NORMAL
SL AMB NOTE:: NORMAL
SL AMB SPECIMEN ADEQUACY: NORMAL

## 2019-03-25 ENCOUNTER — OFFICE VISIT (OUTPATIENT)
Dept: FAMILY MEDICINE CLINIC | Facility: CLINIC | Age: 44
End: 2019-03-25
Payer: COMMERCIAL

## 2019-03-25 ENCOUNTER — HOSPITAL ENCOUNTER (OUTPATIENT)
Dept: MAMMOGRAPHY | Facility: CLINIC | Age: 44
Discharge: HOME | End: 2019-03-25
Payer: COMMERCIAL

## 2019-03-25 VITALS — WEIGHT: 140 LBS | HEIGHT: 65 IN | BODY MASS INDEX: 23.32 KG/M2

## 2019-03-25 VITALS
RESPIRATION RATE: 12 BRPM | SYSTOLIC BLOOD PRESSURE: 110 MMHG | HEART RATE: 68 BPM | DIASTOLIC BLOOD PRESSURE: 62 MMHG | HEIGHT: 65 IN | WEIGHT: 140 LBS | BODY MASS INDEX: 23.32 KG/M2

## 2019-03-25 DIAGNOSIS — Z12.39 SCREENING FOR MALIGNANT NEOPLASM OF BREAST: ICD-10-CM

## 2019-03-25 DIAGNOSIS — B35.1 FUNGAL NAIL INFECTION: Primary | ICD-10-CM

## 2019-03-25 PROCEDURE — 1036F TOBACCO NON-USER: CPT | Performed by: NURSE PRACTITIONER

## 2019-03-25 PROCEDURE — 77067 SCR MAMMO BI INCL CAD: CPT

## 2019-03-25 PROCEDURE — 99213 OFFICE O/P EST LOW 20 MIN: CPT | Performed by: NURSE PRACTITIONER

## 2019-03-25 PROCEDURE — 3008F BODY MASS INDEX DOCD: CPT | Performed by: NURSE PRACTITIONER

## 2019-03-25 PROCEDURE — 77063 BREAST TOMOSYNTHESIS BI: CPT

## 2019-03-25 NOTE — PROGRESS NOTES
Assessment/Plan:    1  Fungal nail infection  Apply the topical solution daily and remove weekly with rubbing alcohol  The nail is likely to lift completely away from the nail bed  - ciclopirox (PENLAC) 8 % solution; Apply topically daily at bedtime  Dispense: 6 6 mL; Refill: 3    rto prn     Subjective:      Patient ID: Nitesh Ramírez is a 37 y o  female  Here today with concern regarding fungal toenail on the R great toe  Has been treating this with teatree oil but not imroving  Now the hail is starting to separate from the nail bed at the base of the nail   OBJECTIVE  Past Medical History:   Diagnosis Date    Broken arm     Migraine      temporarily  Wt Readings from Last 3 Encounters:   03/25/19 63 5 kg (140 lb)   02/18/19 64 kg (141 lb)   12/27/18 64 4 kg (142 lb)     BP Readings from Last 3 Encounters:   03/25/19 110/62   02/18/19 112/60   12/27/18 120/84     Pulse Readings from Last 3 Encounters:   03/25/19 68   02/18/19 64   12/27/18 72     BMI Readings from Last 3 Encounters:   03/25/19 23 30 kg/m²   02/18/19 23 46 kg/m²   12/27/18 23 63 kg/m²        Physical Exam   Constitutional: She appears well-developed and well-nourished  Skin:   R great toe nail with thickened and mycotic appearance 1/2 of nail from the base of the nail  The base of the nail is beginning to lift away  From the nail bed

## 2020-03-18 ENCOUNTER — OFFICE VISIT (OUTPATIENT)
Dept: FAMILY MEDICINE CLINIC | Facility: CLINIC | Age: 45
End: 2020-03-18
Payer: COMMERCIAL

## 2020-03-18 VITALS
DIASTOLIC BLOOD PRESSURE: 60 MMHG | HEIGHT: 65 IN | HEART RATE: 68 BPM | WEIGHT: 140.3 LBS | BODY MASS INDEX: 23.38 KG/M2 | RESPIRATION RATE: 14 BRPM | SYSTOLIC BLOOD PRESSURE: 110 MMHG

## 2020-03-18 DIAGNOSIS — S30.861A TICK BITE OF ABDOMINAL WALL, INITIAL ENCOUNTER: Primary | ICD-10-CM
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
## 2020-03-18 PROCEDURE — 99213 OFFICE O/P EST LOW 20 MIN: CPT | Performed by: PHYSICIAN ASSISTANT

## 2020-03-18 PROCEDURE — 1036F TOBACCO NON-USER: CPT | Performed by: PHYSICIAN ASSISTANT

## 2020-03-18 PROCEDURE — 3008F BODY MASS INDEX DOCD: CPT | Performed by: PHYSICIAN ASSISTANT

## 2020-03-18 RX ORDER — DOXYCYCLINE HYCLATE 100 MG
200 TABLET ORAL ONCE
Qty: 2 TABLET | Refills: 0 | Status: SHIPPED | OUTPATIENT
Start: 2020-03-18 | End: 2020-03-18

## 2020-03-18 NOTE — PROGRESS NOTES
Assessment/Plan:    1  Tick bite of abdominal wall, initial encounter    - take doxy 2 tabs now with food, get labs done in 1 month, discussed signs and symptoms of lyme and when to call  - doxycycline hyclate (VIBRA-TABS) 100 mg tablet; Take 2 tablets (200 mg total) by mouth once for 1 dose  Dispense: 2 tablet; Refill: 0  - Lyme Antibody Profile with reflex to WB    F/u as needed    Subjective:   Chief Complaint   Patient presents with   Caro Primas on right hip      Patient ID: Gabriela Lima is a 53 y o  female  Patient here found a tick in her right flank today while outside, tick still alive  Patient removed entire tick  Wondering what to do now  Thinks it was  Deer tick due to size        The following portions of the patient's history were reviewed and updated as appropriate: allergies, current medications, past family history, past medical history, past social history, past surgical history and problem list     Past Medical History:   Diagnosis Date    Broken arm     Migraine      Past Surgical History:   Procedure Laterality Date    FRACTURE SURGERY      Arm incision, fracture reset     Family History   Problem Relation Age of Onset    No Known Problems Mother     Parkinsonism Father     Migraines Father     Migraines Sister     Parkinsonism Maternal Grandfather     Substance Abuse Neg Hx     Mental illness Neg Hx      Social History     Socioeconomic History    Marital status: Single     Spouse name: Not on file    Number of children: Not on file    Years of education: Not on file    Highest education level: Not on file   Occupational History    Not on file   Social Needs    Financial resource strain: Not on file    Food insecurity:     Worry: Not on file     Inability: Not on file    Transportation needs:     Medical: Not on file     Non-medical: Not on file   Tobacco Use    Smoking status: Never Smoker    Smokeless tobacco: Never Used   Substance and Sexual Activity    Alcohol use: No    Drug use: No    Sexual activity: Not Currently     Partners: Male   Lifestyle    Physical activity:     Days per week: Not on file     Minutes per session: Not on file    Stress: Not on file   Relationships    Social connections:     Talks on phone: Not on file     Gets together: Not on file     Attends Denominational service: Not on file     Active member of club or organization: Not on file     Attends meetings of clubs or organizations: Not on file     Relationship status: Not on file    Intimate partner violence:     Fear of current or ex partner: Not on file     Emotionally abused: Not on file     Physically abused: Not on file     Forced sexual activity: Not on file   Other Topics Concern    Not on file   Social History Narrative    Always uses seatbelt    Caffeine use: 1 cup coffee    Daily caffeine consumption: 1 serving a day     Has smoke detectors       Current Outpatient Medications:     B Complex Vitamins (VITAMIN B COMPLEX PO), Take 1 capsule by mouth, Disp: , Rfl:     Cholecalciferol 1000 units CHEW, Chew, Disp: , Rfl:     ciclopirox (PENLAC) 8 % solution, Apply topically daily at bedtime, Disp: 6 6 mL, Rfl: 3    EPINEPHrine (EPIPEN 2-CINDY) 0 3 mg/0 3 mL SOAJ, Inject as directed, Disp: , Rfl:     Krill Oil 1000 MG CAPS, Take by mouth, Disp: , Rfl:     levothyroxine 25 mcg tablet, Take 25 mcg by mouth, Disp: , Rfl:     MAGNESIUM PO, Take by mouth, Disp: , Rfl:     Multiple Vitamins-Minerals (MULTIVITAMIN PO), Take 1 capsule by mouth, Disp: , Rfl:     Probiotic Product (PROBIOTIC DAILY PO), Take by mouth, Disp: , Rfl:     Review of Systems          Objective:    Vitals:    03/18/20 1322   BP: 110/60   BP Location: Left arm   Patient Position: Sitting   Cuff Size: Standard   Pulse: 68   Resp: 14   Weight: 63 6 kg (140 lb 4 8 oz)   Height: 5' 4 75" (1 645 m)        Physical Exam   Constitutional: She is oriented to person, place, and time  She appears well-developed and well-nourished  Cardiovascular: Normal rate, regular rhythm and normal heart sounds  Pulmonary/Chest: Effort normal and breath sounds normal    Neurological: She is alert and oriented to person, place, and time  Skin: Skin is warm  Small excoriation on right flank about 5 mm   Psychiatric: She has a normal mood and affect

## 2020-10-12 ENCOUNTER — ANNUAL EXAM (OUTPATIENT)
Dept: FAMILY MEDICINE CLINIC | Facility: CLINIC | Age: 45
End: 2020-10-12
Payer: COMMERCIAL

## 2020-10-12 VITALS
DIASTOLIC BLOOD PRESSURE: 70 MMHG | TEMPERATURE: 97.6 F | WEIGHT: 143.7 LBS | RESPIRATION RATE: 16 BRPM | HEIGHT: 65 IN | HEART RATE: 68 BPM | SYSTOLIC BLOOD PRESSURE: 110 MMHG | BODY MASS INDEX: 23.94 KG/M2

## 2020-10-12 DIAGNOSIS — H00.012 HORDEOLUM EXTERNUM OF RIGHT LOWER EYELID: ICD-10-CM

## 2020-10-12 DIAGNOSIS — Z12.31 ENCOUNTER FOR SCREENING MAMMOGRAM FOR MALIGNANT NEOPLASM OF BREAST: ICD-10-CM

## 2020-10-12 DIAGNOSIS — Z01.419 ENCOUNTER FOR GYNECOLOGICAL EXAMINATION WITH PAPANICOLAOU SMEAR OF CERVIX: Primary | ICD-10-CM

## 2020-10-12 DIAGNOSIS — Z23 NEED FOR VACCINATION: ICD-10-CM

## 2020-10-12 PROCEDURE — 90686 IIV4 VACC NO PRSV 0.5 ML IM: CPT

## 2020-10-12 PROCEDURE — 99396 PREV VISIT EST AGE 40-64: CPT | Performed by: PHYSICIAN ASSISTANT

## 2020-10-12 PROCEDURE — 90471 IMMUNIZATION ADMIN: CPT

## 2020-10-12 PROCEDURE — 1036F TOBACCO NON-USER: CPT | Performed by: PHYSICIAN ASSISTANT

## 2020-10-12 RX ORDER — POLYMYXIN B SULFATE AND TRIMETHOPRIM 1; 10000 MG/ML; [USP'U]/ML
1 SOLUTION OPHTHALMIC EVERY 4 HOURS
Qty: 10 ML | Refills: 1 | Status: SHIPPED | OUTPATIENT
Start: 2020-10-12 | End: 2020-10-19

## 2020-10-12 RX ORDER — LEVOTHYROXINE SODIUM 0.05 MG/1
TABLET ORAL
COMMUNITY
Start: 2020-10-12

## 2020-10-13 LAB
CYTOLOGIST CVX/VAG CYTO: NORMAL
DX ICD CODE: NORMAL
OTHER STN SPEC: NORMAL
OTHER STN SPEC: NORMAL
PATH REPORT.FINAL DX SPEC: NORMAL
SL AMB NOTE:: NORMAL
SL AMB SPECIMEN ADEQUACY: NORMAL
SL AMB TEST METHODOLOGY: NORMAL

## 2021-04-13 DIAGNOSIS — Z23 ENCOUNTER FOR IMMUNIZATION: ICD-10-CM

## 2021-08-06 ENCOUNTER — TELEPHONE (OUTPATIENT)
Dept: FAMILY MEDICINE CLINIC | Facility: CLINIC | Age: 46
End: 2021-08-06

## 2021-11-22 ENCOUNTER — HOSPITAL ENCOUNTER (OUTPATIENT)
Dept: RADIOLOGY | Age: 46
Discharge: HOME | End: 2021-11-22
Payer: COMMERCIAL

## 2021-11-22 VITALS — WEIGHT: 143 LBS | HEIGHT: 65 IN | BODY MASS INDEX: 23.82 KG/M2

## 2021-11-22 DIAGNOSIS — Z12.31 ENCOUNTER FOR SCREENING MAMMOGRAM FOR MALIGNANT NEOPLASM OF BREAST: ICD-10-CM

## 2021-11-22 PROCEDURE — 77067 SCR MAMMO BI INCL CAD: CPT

## 2021-11-22 PROCEDURE — 77063 BREAST TOMOSYNTHESIS BI: CPT

## 2022-05-10 ENCOUNTER — TELEPHONE (OUTPATIENT)
Dept: FAMILY MEDICINE CLINIC | Facility: CLINIC | Age: 47
End: 2022-05-10

## 2022-05-10 NOTE — TELEPHONE ENCOUNTER
Patient called to say this is day 4 of her positive covid result  She has heavy chest congestion, sore throat  She had a fever, but that is gone  She doesn't feel like she's getting any better  She is wondering if you can prescribe Paxlovid for her? She does have a copy of her Positive at home test     Please send script to [facility] in [place]

## 2022-05-10 NOTE — TELEPHONE ENCOUNTER
Patient is going to wait until tomorrow morning to see how she feels  If not better, she will call and schedule a Virtual Visit

## 2022-05-12 ENCOUNTER — TELEMEDICINE (OUTPATIENT)
Dept: FAMILY MEDICINE CLINIC | Facility: CLINIC | Age: 47
End: 2022-05-12
Payer: COMMERCIAL

## 2022-05-12 DIAGNOSIS — U07.1 COVID-19: Primary | ICD-10-CM

## 2022-05-12 PROCEDURE — 99442 PR PHYS/QHP TELEPHONE EVALUATION 11-20 MIN: CPT | Performed by: NURSE PRACTITIONER

## 2022-05-12 NOTE — PROGRESS NOTES
COVID-19 Outpatient Progress Note    Assessment/Plan:    Pt presents via televideo after testing positive for covid on Sunday 05/08  She notes that she started on Saturday 05/07  Her symptoms have improved but have consisted of productive cough (yellow mucus), sore throat & low grade fevers  Patient has been taking Mucinex BID and notes this has been helpful  She also takes Vitamin C, D  & Zinc   We did review quarantine guidelines today  Patient is encouraged to call our office for any questions/concerns, persistent or worsening symptoms  Patient states they understand and agree with treatment plan  Problem List Items Addressed This Visit    None     Visit Diagnoses     COVID-19    -  Primary         Disposition:     Patient has COVID-19 infection  Based off CDC guidelines, they were recommended to isolate for 5 days from the date of the positive test  If they remain asymptomatic, isolation may be ended followed by 5 days of wearing a mask when around othes to minimize risk of infecting others  If they have a fever, continue to stay home until fever resolves for at least 24 hours  I have spent 15 minutes directly with the patient  Greater than 50% of this time was spent in counseling/coordination of care regarding: instructions for management  Encounter provider HUSSAIN Lara    Provider located at [address] [place] [id number]  [phone]    Recent Visits  Date Type Provider Dept   05/10/22 Telephone Iona Madrigal [address] [facility]   Showing recent visits within past 7 days and meeting all other requirements  Today's Visits  Date Type Provider Dept   05/12/22 Telemedicine HUSSAIN Lara [address] [facility]   Showing today's visits and meeting all other requirements  Future Appointments  No visits were found meeting these conditions    Showing future appointments within next 150 days and meeting all other requirements     This virtual check-in was done via telephone and she agrees to proceed  Patient agrees to participate in a virtual check in via telephone or video visit instead of presenting to the office to address urgent/immediate medical needs  Patient is aware this is a billable service  After connecting through Telephone, the patient was identified by name and date of birth  Quita Hassan was informed that this was a telemedicine visit and that the exam was being conducted confidentially over secure lines  My office door was closed  No one else was in the room  Quita Hassan acknowledged consent and understanding of privacy and security of the telemedicine visit  I informed the patient that I have reviewed her record in Epic and presented the opportunity for her to ask any questions regarding the visit today  The patient agreed to participate  It was my intent to perform this visit via video technology but the patient was not able to do a video connection so the visit was completed via audio telephone only  Verification of patient location:  Patient is located in the following state in which I hold an active license: [place]    Subjective:   Quita Hassan is a 48 y o  female who has been screened for COVID-19  Symptom change since last report: improving  Patient's symptoms include fever (low grade at this time 99 6), nasal congestion, rhinorrhea and cough (productive with yellow phlegm)  Patient denies headaches  - Date of symptom onset: 5/7/2022  - Date of positive COVID-19 test: 5/8/2022  Type of test: Home antigen  Home antigen result verified by provider  Will get picture of test uploaded/scanned into patient's chart  COVID-19 vaccination status: Fully vaccinated with booster    Jessica Nuñez has been staying home and has isolated themselves in her home   She is taking care to not share personal items and is cleaning all surfaces that are touched often, like counters, tabletops, and doorknobs using household cleaning sprays or wipes  She is wearing a mask when she leaves her room  Pt presents via televideo after testing positive for covid on Sunday 05/08  She notes that she started on Saturday 05/07  Her symptoms have improved but have consisted of productive cough (yellow mucus), sore throat & low grade fevers  Patient has been taking Mucinex BID and notes this has been helpful  She also takes Vitamin C, D  & Zinc   We did review quarantine guidelines today  No results found for: SARSCOV2, [facility], SARSCORONAVI, CORONAVIRUSR, SARSCOVAG, [facility]  Past Medical History:   Diagnosis Date    Broken arm     Migraine      Past Surgical History:   Procedure Laterality Date    FRACTURE SURGERY      Arm incision, fracture reset     Current Outpatient Medications   Medication Sig Dispense Refill    B Complex Vitamins (VITAMIN B COMPLEX PO) Take 1 capsule by mouth      Cholecalciferol 1000 units CHEW Chew      EPINEPHrine (EPIPEN) 0 3 mg/0 3 mL SOAJ Inject as directed      Krill Oil 1000 MG CAPS Take by mouth      levothyroxine 50 mcg tablet       MAGNESIUM PO Take by mouth      Multiple Vitamins-Minerals (MULTIVITAMIN PO) Take 1 capsule by mouth      Probiotic Product (PROBIOTIC DAILY PO) Take by mouth       No current facility-administered medications for this visit  Allergies   Allergen Reactions    Other Anaphylaxis     Action Taken: Wasp and Yellow Jacket; Review of Systems   Constitutional: Positive for fever (low grade at this time 99 6)  HENT: Positive for congestion, postnasal drip and rhinorrhea  Negative for ear discharge, ear pain, sinus pressure and sinus pain  Respiratory: Positive for cough (productive with yellow phlegm)  Cardiovascular: Negative  Gastrointestinal: Negative  Musculoskeletal: Negative  Neurological: Negative for headaches  Objective:     There were no vitals filed for this visit  Physical Exam    VIRTUAL VISIT DISCLAIMER    Maikel Hernandez verbally agrees to participate in [employer]  Pt is aware that [employer] could be limited without vital signs or the ability to perform a full hands-on physical Colin Arturo understands she or the provider may request at any time to terminate the video visit and request the patient to seek care or treatment in person

## 2022-06-01 ENCOUNTER — OFFICE VISIT (OUTPATIENT)
Dept: FAMILY MEDICINE CLINIC | Facility: CLINIC | Age: 47
End: 2022-06-01
Payer: COMMERCIAL

## 2022-06-01 VITALS
RESPIRATION RATE: 15 BRPM | SYSTOLIC BLOOD PRESSURE: 118 MMHG | HEART RATE: 80 BPM | HEIGHT: 65 IN | WEIGHT: 140.8 LBS | DIASTOLIC BLOOD PRESSURE: 78 MMHG | BODY MASS INDEX: 23.46 KG/M2 | OXYGEN SATURATION: 98 %

## 2022-06-01 DIAGNOSIS — Z13.6 SCREENING FOR CARDIOVASCULAR CONDITION: ICD-10-CM

## 2022-06-01 DIAGNOSIS — Z00.00 ANNUAL PHYSICAL EXAM: Primary | ICD-10-CM

## 2022-06-01 DIAGNOSIS — Z13.220 SCREENING FOR LIPID DISORDERS: ICD-10-CM

## 2022-06-01 DIAGNOSIS — E55.9 VITAMIN D DEFICIENCY: ICD-10-CM

## 2022-06-01 DIAGNOSIS — R06.2 WHEEZING: ICD-10-CM

## 2022-06-01 DIAGNOSIS — Z13.228 SCREENING FOR METABOLIC DISORDER: ICD-10-CM

## 2022-06-01 PROCEDURE — 3008F BODY MASS INDEX DOCD: CPT | Performed by: NURSE PRACTITIONER

## 2022-06-01 PROCEDURE — 1036F TOBACCO NON-USER: CPT | Performed by: NURSE PRACTITIONER

## 2022-06-01 PROCEDURE — 99396 PREV VISIT EST AGE 40-64: CPT | Performed by: NURSE PRACTITIONER

## 2022-06-01 RX ORDER — ALBUTEROL SULFATE 90 UG/1
2 AEROSOL, METERED RESPIRATORY (INHALATION) EVERY 6 HOURS PRN
Qty: 8 G | Refills: 2 | Status: SHIPPED | OUTPATIENT
Start: 2022-06-01

## 2022-06-01 NOTE — PROGRESS NOTES
ADULT ANNUAL PHYSICAL  [facility]    NAME: Mary Castro  AGE: 48 y o  SEX: female  : 1975     DATE: 2022     Assessment and Plan:  1  Labs ordered  2  Mammo UTD  3  PAP UTD  4  Pt deferring colonoscopy  5  F/u in 1 year for annual physical or sooner PRN  Problem List Items Addressed This Visit        Other    Vitamin D deficiency    Relevant Orders    Vitamin D 25 hydroxy      Other Visit Diagnoses     Annual physical exam    -  Primary    Wheezing        Relevant Medications    albuterol (PROVENTIL HFA,VENTOLIN HFA) 90 mcg/act inhaler    Screening for cardiovascular condition        Relevant Orders    CBC and differential    Screening for lipid disorders        Screening for metabolic disorder              Immunizations and preventive care screenings were discussed with patient today  Appropriate education was printed on patient's after visit summary  Counseling:  Alcohol/drug use: discussed moderation in alcohol intake, the recommendations for healthy alcohol use, and avoidance of illicit drug use  Dental Health: discussed importance of regular tooth brushing, flossing, and dental visits  Injury prevention: discussed safety/seat belts, safety helmets, smoke detectors, carbon dioxide detectors, and smoking near bedding or upholstery  Sexual health: discussed sexually transmitted diseases, partner selection, use of condoms, avoidance of unintended pregnancy, and contraceptive alternatives  · Exercise: the importance of regular exercise/physical activity was discussed  Recommend exercise 3-5 times per week for at least 30 minutes            Return in about 1 year (around 2023) for Annual physical      Chief Complaint:     Chief Complaint   Patient presents with    Physical Exam      History of Present Illness:     Adult Annual Physical   Patient here for a comprehensive physical exam  The patient reports no problems  Diet and Physical Activity  · Diet/Nutrition: well balanced diet, heart healthy (low sodium) diet and consuming 3-5 servings of fruits/vegetables daily  · Exercise: moderate cardiovascular exercise, 3-4 times a week on average and 30-60 minutes on average  Depression Screening  PHQ-2/9 Depression Screening         General Health  · Sleep: sleeps well and gets 7-8 hours of sleep on average  · Hearing: normal - bilateral   · Vision: no vision problems, most recent eye exam <1 year ago and wears glasses  · Dental: regular dental visits, brushes teeth twice daily and flosses teeth occasionally  /GYN Health  · Patient is: premenopausal  · Last menstrual period: 05/30/2022       Review of Systems:     Review of Systems   Constitutional: Negative  Negative for chills and fatigue  HENT: Negative  Respiratory: Negative  Negative for cough and shortness of breath  Cardiovascular: Negative  Negative for chest pain  Gastrointestinal: Negative  Genitourinary: Negative  Musculoskeletal: Negative  Negative for myalgias  Neurological: Negative         Past Medical History:     Past Medical History:   Diagnosis Date    Broken arm     Migraine       Past Surgical History:     Past Surgical History:   Procedure Laterality Date    FRACTURE SURGERY      Arm incision, fracture reset      Social History:     Social History     Socioeconomic History    Marital status: Single     Spouse name: None    Number of children: None    Years of education: None    Highest education level: None   Occupational History    None   Tobacco Use    Smoking status: Never Smoker    Smokeless tobacco: Never Used   Vaping Use    Vaping Use: Never used   Substance and Sexual Activity    Alcohol use: No    Drug use: No    Sexual activity: Not Currently     Partners: Male   Other Topics Concern    None   Social History Narrative    Always uses seatbelt    Caffeine use: 1 cup coffee    Daily caffeine consumption: 1 serving a day     Has smoke detectors     Social Determinants of Health     Financial Resource Strain: Not on file   Food Insecurity: Not on file   Transportation Needs: Not on file   Physical Activity: Not on file   Stress: Not on file   Social Connections: Not on file   Intimate Partner Violence: Not on file   Housing Stability: Not on file      Family History:     Family History   Problem Relation Age of Onset    No Known Problems Mother     Parkinsonism Father     Migraines Father     Migraines Sister     Brain cancer Sister 34    No Known Problems Maternal Aunt     No Known Problems Paternal Aunt     Substance Abuse Neg Hx     Mental illness Neg Hx       Current Medications:     Current Outpatient Medications   Medication Sig Dispense Refill    albuterol (PROVENTIL HFA,VENTOLIN HFA) 90 mcg/act inhaler Inhale 2 puffs every 6 (six) hours as needed for wheezing or shortness of breath 8 g 2    B Complex Vitamins (VITAMIN B COMPLEX PO) Take 1 capsule by mouth      Cholecalciferol 1000 units CHEW Chew      EPINEPHrine (EPIPEN) 0 3 mg/0 3 mL SOAJ Inject as directed      Krill Oil 1000 MG CAPS Take by mouth      levothyroxine 50 mcg tablet       MAGNESIUM PO Take by mouth      Multiple Vitamins-Minerals (MULTIVITAMIN PO) Take 1 capsule by mouth      Probiotic Product (PROBIOTIC DAILY PO) Take by mouth       No current facility-administered medications for this visit  Allergies: Allergies   Allergen Reactions    Other Anaphylaxis     Action Taken: Wasp and Yellow Jacket; Physical Exam:     /78   Pulse 80   Resp 15   Ht 5' 4 5" (1 638 m)   Wt 63 9 kg (140 lb 12 8 oz)   SpO2 98%   BMI 23 80 kg/m²     Physical Exam  Vitals and nursing note reviewed  Constitutional:       General: She is not in acute distress  Appearance: Normal appearance  She is well-developed  She is not ill-appearing  HENT:      Head: Normocephalic and atraumatic     Eyes: Conjunctiva/sclera: Conjunctivae normal    Neck:      Vascular: No carotid bruit  Cardiovascular:      Rate and Rhythm: Normal rate and regular rhythm  Pulses: Normal pulses  Heart sounds: Normal heart sounds  No murmur heard  Pulmonary:      Effort: Pulmonary effort is normal  No respiratory distress  Breath sounds: Normal breath sounds  No wheezing  Abdominal:      General: There is no distension  Palpations: Abdomen is soft  There is no mass  Tenderness: There is no abdominal tenderness  There is no guarding or rebound  Hernia: No hernia is present  Musculoskeletal:         General: Normal range of motion  Cervical back: Normal range of motion and neck supple  Right lower leg: No edema  Left lower leg: No edema  Skin:     General: Skin is warm and dry  Capillary Refill: Capillary refill takes less than 2 seconds  Neurological:      Mental Status: She is alert and oriented to person, place, and time  Mental status is at baseline  Motor: No weakness  Gait: Gait normal    Psychiatric:         Mood and Affect: Mood normal          Behavior: Behavior normal          Thought Content:  Thought content normal          Judgment: Judgment normal           SHELBY Unger  [address]

## 2022-06-01 NOTE — PATIENT INSTRUCTIONS

## 2022-12-05 ENCOUNTER — OFFICE VISIT (OUTPATIENT)
Dept: FAMILY MEDICINE CLINIC | Facility: CLINIC | Age: 47
End: 2022-12-05

## 2022-12-05 VITALS
HEART RATE: 66 BPM | WEIGHT: 141.8 LBS | BODY MASS INDEX: 23.63 KG/M2 | HEIGHT: 65 IN | SYSTOLIC BLOOD PRESSURE: 118 MMHG | RESPIRATION RATE: 16 BRPM | DIASTOLIC BLOOD PRESSURE: 70 MMHG

## 2022-12-05 DIAGNOSIS — R00.2 PALPITATIONS: Primary | ICD-10-CM

## 2022-12-05 NOTE — PROGRESS NOTES
Assessment/Plan:    1  Palpitations - EKG today NSR, labs ordered, if all normal will proceed with echo and holter  F/u as needed    Subjective:   Chief Complaint   Patient presents with   • Palpitations      Patient ID: Omayra Brothers is a 50 y o  female  Patient was noticing an increase heart rate, smart watch was informing patient of this but she was feeling it before notifications  Heart feeling different  Past 4-6 weeks discomfort noted  Happens daily, multiple times, can last minutes  Variable whether at rest or exercise  Slight sob  Feels the need to take a deep breath at times  Normal sleep, normal diet  No supplements  A pot of coffee daily, mostly decaffienated  Only in morning  Only does yoga, does not interfere, sleep normal does not disrupt sleep  Thinks may be perimenopause related  Had covid in may        The following portions of the patient's history were reviewed and updated as appropriate: allergies, current medications, past family history, past medical history, past social history, past surgical history and problem list     Past Medical History:   Diagnosis Date   • Broken arm    • Migraine      Past Surgical History:   Procedure Laterality Date   • FRACTURE SURGERY      Arm incision, fracture reset     Family History   Problem Relation Age of Onset   • No Known Problems Mother    • Parkinsonism Father    • Migraines Father    • Migraines Sister    • Brain cancer Sister 34   • No Known Problems Maternal Aunt    • No Known Problems Paternal Aunt    • Substance Abuse Neg Hx    • Mental illness Neg Hx      Social History     Socioeconomic History   • Marital status: Single     Spouse name: Not on file   • Number of children: Not on file   • Years of education: Not on file   • Highest education level: Not on file   Occupational History   • Not on file   Tobacco Use   • Smoking status: Never   • Smokeless tobacco: Never   Vaping Use   • Vaping Use: Never used   Substance and Sexual Activity   • Alcohol use: No   • Drug use: No   • Sexual activity: Not Currently     Partners: Male   Other Topics Concern   • Not on file   Social History Narrative    Always uses seatbelt    Caffeine use: 1 cup coffee    Daily caffeine consumption: 1 serving a day     Has smoke detectors     Social Determinants of Health     Financial Resource Strain: Not on file   Food Insecurity: Not on file   Transportation Needs: Not on file   Physical Activity: Not on file   Stress: Not on file   Social Connections: Not on file   Intimate Partner Violence: Not on file   Housing Stability: Not on file       Current Outpatient Medications:   •  albuterol (PROVENTIL HFA,VENTOLIN HFA) 90 mcg/act inhaler, Inhale 2 puffs every 6 (six) hours as needed for wheezing or shortness of breath, Disp: 8 g, Rfl: 2  •  B Complex Vitamins (VITAMIN B COMPLEX PO), Take 1 capsule by mouth, Disp: , Rfl:   •  Cholecalciferol 1000 units CHEW, Chew, Disp: , Rfl:   •  EPINEPHrine (EPIPEN) 0 3 mg/0 3 mL SOAJ, Inject as directed, Disp: , Rfl:   •  Krill Oil 1000 MG CAPS, Take by mouth, Disp: , Rfl:   •  levothyroxine 50 mcg tablet, , Disp: , Rfl:   •  MAGNESIUM PO, Take by mouth, Disp: , Rfl:   •  Multiple Vitamins-Minerals (MULTIVITAMIN PO), Take 1 capsule by mouth, Disp: , Rfl:   •  Probiotic Product (PROBIOTIC DAILY PO), Take by mouth, Disp: , Rfl:     Review of Systems          Objective:    Vitals:    12/05/22 1119   BP: 118/70   Pulse: 66   Resp: 16   Weight: 64 3 kg (141 lb 12 8 oz)   Height: 5' 4 5" (1 638 m)        Physical Exam  Constitutional:       Appearance: She is normal weight  HENT:      Head: Normocephalic and atraumatic  Cardiovascular:      Rate and Rhythm: Normal rate and regular rhythm  Pulses: Normal pulses  Heart sounds: Normal heart sounds  Pulmonary:      Effort: Pulmonary effort is normal       Breath sounds: Normal breath sounds  Musculoskeletal:      Cervical back: Normal range of motion and neck supple  Neurological:      General: No focal deficit present  Mental Status: She is alert and oriented to person, place, and time  Psychiatric:         Mood and Affect: Mood normal          Behavior: Behavior normal          Thought Content:  Thought content normal          Judgment: Judgment normal

## 2022-12-10 LAB
ALBUMIN SERPL-MCNC: 4.2 G/DL (ref 3.8–4.8)
ALBUMIN/GLOB SERPL: 2 {RATIO} (ref 1.2–2.2)
ALP SERPL-CCNC: 49 IU/L (ref 44–121)
ALT SERPL-CCNC: 12 IU/L (ref 0–32)
AST SERPL-CCNC: 15 IU/L (ref 0–40)
BASOPHILS # BLD AUTO: 0 X10E3/UL (ref 0–0.2)
BASOPHILS NFR BLD AUTO: 1 %
BILIRUB SERPL-MCNC: 0.3 MG/DL (ref 0–1.2)
BUN SERPL-MCNC: 5 MG/DL (ref 6–24)
BUN/CREAT SERPL: 8 (ref 9–23)
CALCIUM SERPL-MCNC: 8.8 MG/DL (ref 8.7–10.2)
CHLORIDE SERPL-SCNC: 100 MMOL/L (ref 96–106)
CO2 SERPL-SCNC: 27 MMOL/L (ref 20–29)
CREAT SERPL-MCNC: 0.64 MG/DL (ref 0.57–1)
EGFR: 108 ML/MIN/1.73
EOSINOPHIL # BLD AUTO: 0.1 X10E3/UL (ref 0–0.4)
EOSINOPHIL NFR BLD AUTO: 2 %
ERYTHROCYTE [DISTWIDTH] IN BLOOD BY AUTOMATED COUNT: 12.1 % (ref 11.7–15.4)
GLOBULIN SER-MCNC: 2.1 G/DL (ref 1.5–4.5)
GLUCOSE SERPL-MCNC: 84 MG/DL (ref 70–99)
HCT VFR BLD AUTO: 38.6 % (ref 34–46.6)
HGB BLD-MCNC: 12.6 G/DL (ref 11.1–15.9)
IMM GRANULOCYTES # BLD: 0 X10E3/UL (ref 0–0.1)
IMM GRANULOCYTES NFR BLD: 0 %
LYMPHOCYTES # BLD AUTO: 1.7 X10E3/UL (ref 0.7–3.1)
LYMPHOCYTES NFR BLD AUTO: 29 %
MCH RBC QN AUTO: 31.4 PG (ref 26.6–33)
MCHC RBC AUTO-ENTMCNC: 32.6 G/DL (ref 31.5–35.7)
MCV RBC AUTO: 96 FL (ref 79–97)
MONOCYTES # BLD AUTO: 0.5 X10E3/UL (ref 0.1–0.9)
MONOCYTES NFR BLD AUTO: 8 %
NEUTROPHILS # BLD AUTO: 3.6 X10E3/UL (ref 1.4–7)
NEUTROPHILS NFR BLD AUTO: 60 %
PLATELET # BLD AUTO: 266 X10E3/UL (ref 150–450)
POTASSIUM SERPL-SCNC: 4.3 MMOL/L (ref 3.5–5.2)
PROT SERPL-MCNC: 6.3 G/DL (ref 6–8.5)
RBC # BLD AUTO: 4.01 X10E6/UL (ref 3.77–5.28)
SODIUM SERPL-SCNC: 137 MMOL/L (ref 134–144)
TSH SERPL DL<=0.005 MIU/L-ACNC: 2.51 UIU/ML (ref 0.45–4.5)
WBC # BLD AUTO: 5.9 X10E3/UL (ref 3.4–10.8)

## 2022-12-28 ENCOUNTER — HOSPITAL ENCOUNTER (OUTPATIENT)
Dept: NON INVASIVE DIAGNOSTICS | Age: 47
Discharge: HOME | End: 2022-12-28

## 2022-12-28 VITALS
WEIGHT: 141 LBS | DIASTOLIC BLOOD PRESSURE: 64 MMHG | BODY MASS INDEX: 24.07 KG/M2 | SYSTOLIC BLOOD PRESSURE: 102 MMHG | HEART RATE: 67 BPM | HEIGHT: 64 IN

## 2022-12-28 DIAGNOSIS — R00.2 PALPITATIONS: ICD-10-CM

## 2022-12-28 LAB
AORTIC ROOT: 2.7 CM
APICAL FOUR CHAMBER EJECTION FRACTION: 65 %
ASCENDING AORTA: 3.1 CM
DOP CALC LVOT AREA: 2.83 CM2
DOP CALC LVOT DIAMETER: 1.9 CM
E WAVE DECELERATION TIME: 116 MS
FRACTIONAL SHORTENING: 31 % (ref 28–44)
INTERVENTRICULAR SEPTUM IN DIASTOLE (PARASTERNAL SHORT AXIS VIEW): 0.9 CM
INTERVENTRICULAR SEPTUM: 0.9 CM (ref 0.6–1.1)
LAAS-AP2: 18.5 CM2
LAAS-AP4: 16.8 CM2
LEFT ATRIUM AREA SYSTOLE SINGLE PLANE A4C: 16.5 CM2
LEFT ATRIUM SIZE: 3.6 CM
LEFT INTERNAL DIMENSION IN SYSTOLE: 3.3 CM (ref 2.1–4)
LEFT VENTRICULAR INTERNAL DIMENSION IN DIASTOLE: 4.8 CM (ref 3.5–6)
LEFT VENTRICULAR POSTERIOR WALL IN END DIASTOLE: 0.8 CM
LEFT VENTRICULAR STROKE VOLUME: 64 ML
LVSV (TEICH): 64 ML
MV E'TISSUE VEL-SEP: 11 CM/S
MV PEAK A VEL: 0.44 M/S
MV PEAK E VEL: 85 CM/S
MV STENOSIS PRESSURE HALF TIME: 34 MS
MV VALVE AREA P 1/2 METHOD: 6.47 CM2
RIGHT ATRIUM AREA SYSTOLE A4C: 14.2 CM2
RIGHT VENTRICLE ID DIMENSION: 3.4 CM
SL CV LEFT ATRIUM LENGTH A2C: 4.6 CM
SL CV LV EF: 60
SL CV PED ECHO LEFT VENTRICLE DIASTOLIC VOLUME (MOD BIPLANE) 2D: 109 ML
SL CV PED ECHO LEFT VENTRICLE SYSTOLIC VOLUME (MOD BIPLANE) 2D: 45 ML
TR MAX PG: 24 MMHG
TR PEAK VELOCITY: 2.4 M/S
TRICUSPID ANNULAR PLANE SYSTOLIC EXCURSION: 1.9 CM
TRICUSPID VALVE PEAK REGURGITATION VELOCITY: 2.43 M/S

## 2023-01-19 ENCOUNTER — OFFICE VISIT (OUTPATIENT)
Dept: FAMILY MEDICINE CLINIC | Facility: CLINIC | Age: 48
End: 2023-01-19

## 2023-01-19 VITALS
BODY MASS INDEX: 22.66 KG/M2 | DIASTOLIC BLOOD PRESSURE: 80 MMHG | HEART RATE: 78 BPM | HEIGHT: 66 IN | WEIGHT: 141 LBS | SYSTOLIC BLOOD PRESSURE: 110 MMHG | RESPIRATION RATE: 16 BRPM

## 2023-01-19 DIAGNOSIS — R06.00 DYSPNEA, UNSPECIFIED TYPE: Primary | ICD-10-CM

## 2023-01-19 DIAGNOSIS — Z12.31 ENCOUNTER FOR SCREENING MAMMOGRAM FOR MALIGNANT NEOPLASM OF BREAST: ICD-10-CM

## 2023-01-19 DIAGNOSIS — R00.2 PALPITATIONS: ICD-10-CM

## 2023-01-19 NOTE — PROGRESS NOTES
Assessment/Plan:    1  Palpitations - probably due to menopause, reviewed, labs, echo, holter  Currently not bothering patient  2  Sigh dyspnea - noted after covid, will order CXR to be thorough    F/u physical summer  F/u as needed    Subjective:   Chief Complaint   Patient presents with   • Discuss test results      Patient ID: Colin Medina is a 50 y o  female  Patient here to review work up from palpitations  Note it has been more quiet recently  Also notes she has always had the need to take a deep breath at times, sometimes hard to do  Has noted this since childhood  Note it happening even now, thought it was initially due ot anxiety but is no longer anxious  Wondering if it is due to covid        The following portions of the patient's history were reviewed and updated as appropriate: allergies, current medications, past family history, past medical history, past social history, past surgical history and problem list     Past Medical History:   Diagnosis Date   • Broken arm    • Migraine      Past Surgical History:   Procedure Laterality Date   • FRACTURE SURGERY      Arm incision, fracture reset     Family History   Problem Relation Age of Onset   • No Known Problems Mother    • Parkinsonism Father    • Migraines Father    • Migraines Sister    • Brain cancer Sister 34   • No Known Problems Maternal Aunt    • No Known Problems Paternal Aunt    • Substance Abuse Neg Hx    • Mental illness Neg Hx      Social History     Socioeconomic History   • Marital status: Single     Spouse name: Not on file   • Number of children: Not on file   • Years of education: Not on file   • Highest education level: Not on file   Occupational History   • Not on file   Tobacco Use   • Smoking status: Never   • Smokeless tobacco: Never   Vaping Use   • Vaping Use: Never used   Substance and Sexual Activity   • Alcohol use: No   • Drug use: No   • Sexual activity: Not Currently     Partners: Male   Other Topics Concern   • Not on file   Social History Narrative    Always uses seatbelt    Caffeine use: 1 cup coffee    Daily caffeine consumption: 1 serving a day     Has smoke detectors     Social Determinants of Health     Financial Resource Strain: Not on file   Food Insecurity: Not on file   Transportation Needs: Not on file   Physical Activity: Not on file   Stress: Not on file   Social Connections: Not on file   Intimate Partner Violence: Not on file   Housing Stability: Not on file       Current Outpatient Medications:   •  albuterol (PROVENTIL HFA,VENTOLIN HFA) 90 mcg/act inhaler, Inhale 2 puffs every 6 (six) hours as needed for wheezing or shortness of breath, Disp: 8 g, Rfl: 2  •  B Complex Vitamins (VITAMIN B COMPLEX PO), Take 1 capsule by mouth, Disp: , Rfl:   •  EPINEPHrine (EPIPEN) 0 3 mg/0 3 mL SOAJ, Inject as directed, Disp: , Rfl:   •  levothyroxine 50 mcg tablet, , Disp: , Rfl:   •  MAGNESIUM PO, Take by mouth, Disp: , Rfl:   •  Multiple Vitamins-Minerals (MULTIVITAMIN PO), Take 1 capsule by mouth, Disp: , Rfl:     Review of Systems          Objective:    Vitals:    01/19/23 0954   BP: 110/80   BP Location: Left arm   Patient Position: Sitting   Cuff Size: Standard   Pulse: 78   Resp: 16   Weight: 64 kg (141 lb)   Height: 5' 5 5" (1 664 m)        Physical Exam  Constitutional:       Appearance: Normal appearance  She is normal weight  HENT:      Head: Normocephalic and atraumatic  Neurological:      General: No focal deficit present  Mental Status: She is alert and oriented to person, place, and time  Psychiatric:         Mood and Affect: Mood normal          Behavior: Behavior normal          Thought Content:  Thought content normal          Judgment: Judgment normal

## 2023-06-14 ENCOUNTER — TELEPHONE (OUTPATIENT)
Dept: FAMILY MEDICINE CLINIC | Facility: CLINIC | Age: 48
End: 2023-06-14

## 2023-06-14 NOTE — TELEPHONE ENCOUNTER
Pt scheduled appt with you for 6/28 concerning symptoms associated with the possible start of menopause    She would like you to enter blood labs for related tests at [facility]

## 2023-12-08 ENCOUNTER — OFFICE VISIT (OUTPATIENT)
Dept: FAMILY MEDICINE CLINIC | Facility: CLINIC | Age: 48
End: 2023-12-08
Payer: COMMERCIAL

## 2023-12-08 VITALS
HEIGHT: 65 IN | DIASTOLIC BLOOD PRESSURE: 70 MMHG | WEIGHT: 138 LBS | BODY MASS INDEX: 22.99 KG/M2 | SYSTOLIC BLOOD PRESSURE: 104 MMHG | RESPIRATION RATE: 16 BRPM | HEART RATE: 64 BPM | OXYGEN SATURATION: 97 % | TEMPERATURE: 97.7 F

## 2023-12-08 DIAGNOSIS — E04.1 THYROID NODULE: ICD-10-CM

## 2023-12-08 DIAGNOSIS — E55.9 VITAMIN D DEFICIENCY: ICD-10-CM

## 2023-12-08 DIAGNOSIS — R19.00 PELVIC FULLNESS: ICD-10-CM

## 2023-12-08 DIAGNOSIS — N92.0 SPOTTING: ICD-10-CM

## 2023-12-08 DIAGNOSIS — Z12.11 SCREENING FOR MALIGNANT NEOPLASM OF COLON: ICD-10-CM

## 2023-12-08 DIAGNOSIS — R69 TAKING MEDICATION FOR CHRONIC DISEASE: ICD-10-CM

## 2023-12-08 DIAGNOSIS — E03.9 ACQUIRED HYPOTHYROIDISM: ICD-10-CM

## 2023-12-08 DIAGNOSIS — Z00.00 ANNUAL PHYSICAL EXAM: Primary | ICD-10-CM

## 2023-12-08 PROCEDURE — 99396 PREV VISIT EST AGE 40-64: CPT | Performed by: PHYSICIAN ASSISTANT

## 2023-12-08 RX ORDER — MELATONIN
4000 DAILY
COMMUNITY

## 2023-12-08 NOTE — PROGRESS NOTES
ADULT ANNUAL PHYSICAL  [id number] [facility]    NAME: Delaney Pina  AGE: 46 y.o. SEX: female  : 1975     DATE: 2023     Assessment and Plan:     Healthy 46year old female    Immunizations and preventive care screenings were discussed with patient today. Appropriate education was printed on patient's after visit summary. Counseling:  Alcohol/drug use: discussed moderation in alcohol intake, the recommendations for healthy alcohol use, and avoidance of illicit drug use. Dental Health: discussed importance of regular tooth brushing, flossing, and dental visits. Injury prevention: discussed safety/seat belts, safety helmets, smoke detectors, carbon dioxide detectors, and smoking near bedding or upholstery. Sexual health: discussed sexually transmitted diseases, partner selection, use of condoms, avoidance of unintended pregnancy, and contraceptive alternatives. Exercise: the importance of regular exercise/physical activity was discussed. Recommend exercise 3-5 times per week for at least 30 minutes. Return in 1 year (on 2024). Chief Complaint:     Chief Complaint   Patient presents with    Physical Exam      History of Present Illness:     Adult Annual Physical   Patient here for a comprehensive physical exam. The patient reports no problems. Diet and Physical Activity  Diet/Nutrition: well balanced diet. Exercise: moderate cardiovascular exercise. Depression Screening  PHQ-2/9 Depression Screening    Little interest or pleasure in doing things: 0 - not at all  Feeling down, depressed, or hopeless: 0 - not at all  PHQ-2 Score: 0  PHQ-2 Interpretation: Negative depression screen       General Health  Sleep: sleeps well. Hearing: normal - bilateral.  Vision: no vision problems. Dental: regular dental visits.        /GYN Health  Follows with gynecology? no   Patient is: perimenopausal  Pap due now  Mammo due  Colonoscopy ordered     Review of Systems:     Review of Systems   Constitutional: Negative. HENT: Negative. Eyes: Negative. Respiratory: Negative. Cardiovascular: Negative. Gastrointestinal: Negative. Endocrine: Negative. Genitourinary: Negative. Musculoskeletal: Negative. Skin: Negative. Allergic/Immunologic: Negative. Neurological: Negative. Hematological: Negative. Psychiatric/Behavioral: Negative.         Past Medical History:     Past Medical History:   Diagnosis Date    Broken arm     Migraine       Past Surgical History:     Past Surgical History:   Procedure Laterality Date    FRACTURE SURGERY      Arm incision, fracture reset    WISDOM TOOTH EXTRACTION        Social History:     Social History     Socioeconomic History    Marital status: Single     Spouse name: None    Number of children: None    Years of education: None    Highest education level: None   Occupational History    None   Tobacco Use    Smoking status: Never    Smokeless tobacco: Never   Vaping Use    Vaping Use: Never used   Substance and Sexual Activity    Alcohol use: No    Drug use: No    Sexual activity: Not Currently     Partners: Male   Other Topics Concern    None   Social History Narrative    Always uses seatbelt    Caffeine use: 1 cup coffee    Daily caffeine consumption: 1 serving a day     Has smoke detectors     Social Determinants of Health     Financial Resource Strain: Not on file   Food Insecurity: Not on file   Transportation Needs: Not on file   Physical Activity: Not on file   Stress: Not on file   Social Connections: Not on file   Intimate Partner Violence: Not on file   Housing Stability: Not on file      Family History:     Family History   Problem Relation Age of Onset    No Known Problems Mother     Parkinsonism Father     Migraines Father     Migraines Sister     Brain cancer Sister 29    No Known Problems Maternal Aunt     No Known Problems Paternal Aunt Substance Abuse Neg Hx     Mental illness Neg Hx       Current Medications:     Current Outpatient Medications   Medication Sig Dispense Refill    B Complex Vitamins (VITAMIN B COMPLEX PO) Take 1 capsule by mouth      cholecalciferol (VITAMIN D3) 1,000 units tablet Take 4,000 Units by mouth daily      levothyroxine 50 mcg tablet Take 50 mcg by mouth daily      MAGNESIUM PO Take by mouth      Multiple Vitamins-Minerals (MULTIVITAMIN PO) Take 1 capsule by mouth      albuterol (PROVENTIL HFA,VENTOLIN HFA) 90 mcg/act inhaler Inhale 2 puffs every 6 (six) hours as needed for wheezing or shortness of breath (Patient not taking: Reported on 12/8/2023) 8 g 2    EPINEPHrine (EPIPEN) 0.3 mg/0.3 mL SOAJ Inject as directed (Patient not taking: Reported on 12/8/2023)       No current facility-administered medications for this visit. Allergies: Allergies   Allergen Reactions    Other Anaphylaxis     Action Taken: Wasp and Yellow Jacket; Physical Exam:     /70   Pulse 64   Temp 97.7 °F (36.5 °C) (Temporal)   Resp 16   Ht 5' 5" (1.651 m)   Wt 62.6 kg (138 lb)   SpO2 97%   BMI 22.96 kg/m²     Physical Exam  Constitutional:       Appearance: Normal appearance. She is well-developed and normal weight. HENT:      Head: Normocephalic and atraumatic. Right Ear: Hearing normal.      Left Ear: Hearing normal.      Mouth/Throat:      Pharynx: Uvula midline. Eyes:      Extraocular Movements: Extraocular movements intact. Conjunctiva/sclera: Conjunctivae normal.      Pupils: Pupils are equal, round, and reactive to light. Neck:      Thyroid: No thyromegaly. Cardiovascular:      Rate and Rhythm: Normal rate and regular rhythm. Pulses: Normal pulses. Heart sounds: Normal heart sounds. No murmur heard. Pulmonary:      Effort: Pulmonary effort is normal.      Breath sounds: Normal breath sounds. Abdominal:      General: Abdomen is flat. Bowel sounds are normal. There is no distension. Palpations: Abdomen is soft. There is no mass. Tenderness: There is no abdominal tenderness. Musculoskeletal:         General: Normal range of motion. Cervical back: Normal range of motion and neck supple. Lymphadenopathy:      Cervical: No cervical adenopathy. Skin:     General: Skin is warm. Neurological:      General: No focal deficit present. Mental Status: She is alert and oriented to person, place, and time. Cranial Nerves: No cranial nerve deficit. Deep Tendon Reflexes: Reflexes normal.   Psychiatric:         Mood and Affect: Mood normal.         Behavior: Behavior normal.         Thought Content:  Thought content normal.         Judgment: Judgment normal.          Renea Fischer PA-C  [address]

## 2023-12-08 NOTE — PATIENT INSTRUCTIONS
Wellness Visit for Adults   AMBULATORY CARE:   A wellness visit  is when you see your healthcare provider to get screened for health problems. Your healthcare provider will also give you advice on how to stay healthy. Write down your questions so you remember to ask them. Ask your healthcare provider how often you should have a wellness visit. What happens at a wellness visit:  Your healthcare provider will ask about your health, and your family history of health problems. This includes high blood pressure, heart disease, and cancer. He or she will ask if you have symptoms that concern you, if you smoke, and about your mood. You may also be asked about your intake of medicines, supplements, food, and alcohol. Any of the following may be done: Your weight  will be checked. Your height may also be checked so your body mass index (BMI) can be calculated. Your BMI shows if you are at a healthy weight. Your blood pressure  and heart rate will be checked. Your temperature may also be checked. Blood and urine tests  may be done. Blood tests may be done to check your cholesterol levels. Abnormal cholesterol levels increase your risk for heart disease and stroke. You may also need a blood or urine test to check for diabetes if you are at increased risk. Urine tests may be done to look for signs of an infection or kidney disease. A physical exam  includes checking your heartbeat and lungs with a stethoscope. Your healthcare provider may also check your skin to look for sun damage. Screening tests  may be recommended. A screening test is done to check for diseases that may not cause symptoms. The screening tests you may need depend on your age, gender, family history, and lifestyle habits. For example, colorectal screening may be recommended if you are 48years old or older. Screening tests you need if you are a woman:   A Pap smear  is used to screen for cervical cancer.  Pap smears are usually done every 3 to 5 years depending on your age. You may need them more often if you have had abnormal Pap smear test results in the past. Ask your healthcare provider how often you should have a Pap smear. A mammogram  is an x-ray of your breasts to screen for breast cancer. Experts recommend mammograms every 2 years starting at age 48 years. You may need a mammogram at age 52 years or younger if you have an increased risk for breast cancer. Talk to your healthcare provider about when you should start having mammograms and how often you need them. Vaccines you may need:   Get an influenza vaccine  every year. The influenza vaccine protects you from the flu. Several types of viruses cause the flu. The viruses change over time, so new vaccines are made each year. Get a tetanus-diphtheria (Td) booster vaccine  every 10 years. This vaccine protects you against tetanus and diphtheria. Tetanus is a severe infection that may cause painful muscle spasms and lockjaw. Diphtheria is a severe bacterial infection that causes a thick covering in the back of your mouth and throat. Get a human papillomavirus (HPV) vaccine  if you are female and aged 23 to 32 or male 23 to 24 and never received it. This vaccine protects you from HPV infection. HPV is the most common infection spread by sexual contact. HPV may also cause vaginal, penile, and anal cancers. Get a pneumococcal vaccine  if you are aged 72 years or older. The pneumococcal vaccine is an injection given to protect you from pneumococcal disease. Pneumococcal disease is an infection caused by pneumococcal bacteria. The infection may cause pneumonia, meningitis, or an ear infection. Get a shingles vaccine  if you are 60 or older, even if you have had shingles before. The shingles vaccine is an injection to protect you from the varicella-zoster virus. This is the same virus that causes chickenpox.  Shingles is a painful rash that develops in people who had chickenpox or have been exposed to the virus. How to eat healthy:  My Plate is a model for planning healthy meals. It shows the types and amounts of foods that should go on your plate. Fruits and vegetables make up about half of your plate, and grains and protein make up the other half. A serving of dairy is included on the side of your plate. The amount of calories and serving sizes you need depends on your age, gender, weight, and height. Examples of healthy foods are listed below:  Eat a variety of vegetables  such as dark green, red, and orange vegetables. You can also include canned vegetables low in sodium (salt) and frozen vegetables without added butter or sauces. Eat a variety of fresh fruits , canned fruit in 100% juice, frozen fruit, and dried fruit. Include whole grains. At least half of the grains you eat should be whole grains. Examples include whole-wheat bread, wheat pasta, brown rice, and whole-grain cereals such as oatmeal.    Eat a variety of protein foods such as seafood (fish and shellfish), lean meat, and poultry without skin (turkey and chicken). Examples of lean meats include pork leg, shoulder, or tenderloin, and beef round, sirloin, tenderloin, and extra lean ground beef. Other protein foods include eggs and egg substitutes, beans, peas, soy products, nuts, and seeds. Choose low-fat dairy products such as skim or 1% milk or low-fat yogurt, cheese, and cottage cheese. Limit unhealthy fats  such as butter, hard margarine, and shortening. Exercise:  Exercise at least 30 minutes per day on most days of the week. Some examples of exercise include walking, biking, dancing, and swimming. You can also fit in more physical activity by taking the stairs instead of the elevator or parking farther away from stores. Include muscle strengthening activities 2 days each week. Regular exercise provides many health benefits.  It helps you manage your weight, and decreases your risk for type 2 diabetes, heart disease, stroke, and high blood pressure. Exercise can also help improve your mood. Ask your healthcare provider about the best exercise plan for you. General health and safety guidelines:   Do not smoke. Nicotine and other chemicals in cigarettes and cigars can cause lung damage. Ask your healthcare provider for information if you currently smoke and need help to quit. E-cigarettes or smokeless tobacco still contain nicotine. Talk to your healthcare provider before you use these products. Limit alcohol. A drink of alcohol is 12 ounces of beer, 5 ounces of wine, or 1½ ounces of liquor. Lose weight, if needed. Being overweight increases your risk of certain health conditions. These include heart disease, high blood pressure, type 2 diabetes, and certain types of cancer. Protect your skin. Do not sunbathe or use tanning beds. Use sunscreen with a SPF 15 or higher. Apply sunscreen at least 15 minutes before you go outside. Reapply sunscreen every 2 hours. Wear protective clothing, hats, and sunglasses when you are outside. Drive safely. Always wear your seatbelt. Make sure everyone in your car wears a seatbelt. A seatbelt can save your life if you are in an accident. Do not use your cell phone when you are driving. This could distract you and cause an accident. Pull over if you need to make a call or send a text message. Practice safe sex. Use latex condoms if are sexually active and have more than one partner. Your healthcare provider may recommend screening tests for sexually transmitted infections (STIs). Wear helmets, lifejackets, and protective gear. Always wear a helmet when you ride a bike or motorcycle, go skiing, or play sports that could cause a head injury. Wear protective equipment when you play sports. Wear a lifejacket when you are on a boat or doing water sports.     © Copyright [facility] 2023 Information is for End User's use only and may not be sold, redistributed or otherwise used for commercial purposes. The above information is an  only. It is not intended as medical advice for individual conditions or treatments. Talk to your doctor, nurse or pharmacist before following any medical regimen to see if it is safe and effective for you.

## 2024-02-15 LAB
25(OH)D3+25(OH)D2 SERPL-MCNC: 50.4 NG/ML (ref 30–100)
ALBUMIN SERPL-MCNC: 4.2 G/DL (ref 3.9–4.9)
ALBUMIN/GLOB SERPL: 1.9 {RATIO} (ref 1.2–2.2)
ALP SERPL-CCNC: 54 IU/L (ref 44–121)
ALT SERPL-CCNC: 15 IU/L (ref 0–32)
AST SERPL-CCNC: 14 IU/L (ref 0–40)
BASOPHILS # BLD AUTO: 0 X10E3/UL (ref 0–0.2)
BASOPHILS NFR BLD AUTO: 1 %
BILIRUB SERPL-MCNC: 0.3 MG/DL (ref 0–1.2)
BUN SERPL-MCNC: 15 MG/DL (ref 6–24)
BUN/CREAT SERPL: 22 (ref 9–23)
CALCIUM SERPL-MCNC: 8.7 MG/DL (ref 8.7–10.2)
CHLORIDE SERPL-SCNC: 100 MMOL/L (ref 96–106)
CHOLEST SERPL-MCNC: 125 MG/DL (ref 100–199)
CO2 SERPL-SCNC: 22 MMOL/L (ref 20–29)
CREAT SERPL-MCNC: 0.67 MG/DL (ref 0.57–1)
EGFR: 106 ML/MIN/1.73
EOSINOPHIL # BLD AUTO: 0.1 X10E3/UL (ref 0–0.4)
EOSINOPHIL NFR BLD AUTO: 1 %
ERYTHROCYTE [DISTWIDTH] IN BLOOD BY AUTOMATED COUNT: 11.8 % (ref 11.7–15.4)
GLOBULIN SER-MCNC: 2.2 G/DL (ref 1.5–4.5)
GLUCOSE SERPL-MCNC: 95 MG/DL (ref 70–99)
HCT VFR BLD AUTO: 37.6 % (ref 34–46.6)
HDLC SERPL-MCNC: 56 MG/DL
HGB BLD-MCNC: 12.9 G/DL (ref 11.1–15.9)
IMM GRANULOCYTES # BLD: 0 X10E3/UL (ref 0–0.1)
IMM GRANULOCYTES NFR BLD: 0 %
LDLC SERPL CALC-MCNC: 58 MG/DL (ref 0–99)
LDLC/HDLC SERPL: 1 RATIO (ref 0–3.2)
LYMPHOCYTES # BLD AUTO: 1.6 X10E3/UL (ref 0.7–3.1)
LYMPHOCYTES NFR BLD AUTO: 29 %
MCH RBC QN AUTO: 32.9 PG (ref 26.6–33)
MCHC RBC AUTO-ENTMCNC: 34.3 G/DL (ref 31.5–35.7)
MCV RBC AUTO: 96 FL (ref 79–97)
MONOCYTES # BLD AUTO: 0.4 X10E3/UL (ref 0.1–0.9)
MONOCYTES NFR BLD AUTO: 7 %
NEUTROPHILS # BLD AUTO: 3.5 X10E3/UL (ref 1.4–7)
NEUTROPHILS NFR BLD AUTO: 62 %
PLATELET # BLD AUTO: 239 X10E3/UL (ref 150–450)
POTASSIUM SERPL-SCNC: 4.2 MMOL/L (ref 3.5–5.2)
PROT SERPL-MCNC: 6.4 G/DL (ref 6–8.5)
RBC # BLD AUTO: 3.92 X10E6/UL (ref 3.77–5.28)
SL AMB VLDL CHOLESTEROL CALC: 11 MG/DL (ref 5–40)
SODIUM SERPL-SCNC: 137 MMOL/L (ref 134–144)
TRIGL SERPL-MCNC: 47 MG/DL (ref 0–149)
TSH SERPL DL<=0.005 MIU/L-ACNC: 2.35 UIU/ML (ref 0.45–4.5)
WBC # BLD AUTO: 5.6 X10E3/UL (ref 3.4–10.8)

## 2024-02-20 ENCOUNTER — OFFICE VISIT (OUTPATIENT)
Dept: FAMILY MEDICINE CLINIC | Facility: CLINIC | Age: 49
End: 2024-02-20
Payer: COMMERCIAL

## 2024-02-20 VITALS
HEIGHT: 64 IN | WEIGHT: 136 LBS | SYSTOLIC BLOOD PRESSURE: 110 MMHG | RESPIRATION RATE: 16 BRPM | BODY MASS INDEX: 23.22 KG/M2 | OXYGEN SATURATION: 99 % | HEART RATE: 64 BPM | DIASTOLIC BLOOD PRESSURE: 70 MMHG | TEMPERATURE: 97.7 F

## 2024-02-20 DIAGNOSIS — Z12.31 ENCOUNTER FOR SCREENING MAMMOGRAM FOR BREAST CANCER: ICD-10-CM

## 2024-02-20 DIAGNOSIS — Z12.11 SCREENING FOR MALIGNANT NEOPLASM OF COLON: ICD-10-CM

## 2024-02-20 DIAGNOSIS — Z01.419 GYNECOLOGIC EXAM NORMAL: Primary | ICD-10-CM

## 2024-02-20 DIAGNOSIS — Z12.4 SCREENING FOR CERVICAL CANCER: ICD-10-CM

## 2024-02-20 PROCEDURE — 99396 PREV VISIT EST AGE 40-64: CPT | Performed by: PHYSICIAN ASSISTANT

## 2024-02-20 NOTE — PROGRESS NOTES
ASSESSMENT & PLAN: Emma Andres is a 48 y.o. No obstetric history on file. with normal gynecologic exam.    1.  Routine well woman exam done today.  2.   Pap and HPV:Pap with HPV was done today.  Current ASCCP Guidelines reviewed.   3. Labs were reviewed    4. The following were reviewed in today's visit: breast self exam, mammography screening ordered, menopause, osteoporosis, adequate intake of calcium and vitamin D, exercise, healthy diet, and colonoscopy discussed and ordered.  5. Patient to return to office in 12 months for physical.     All questions have been answered to her satisfaction.    CC:  Annual Gynecologic Examination    HPI: Emma Andres is a 48 y.o. No obstetric history on file. who presents for annual gynecologic examination.  She has the following concerns:  none    Health Maintenance:    She exercises regularly  She wears her seatbelt routinely.    She does perform regular monthly self breast exams.    She feels safe at home.   Patients does follow a balanced diet.      Past Medical History:   Diagnosis Date    Broken arm     Migraine        Past Surgical History:   Procedure Laterality Date    FRACTURE SURGERY      Arm incision, fracture reset    WISDOM TOOTH EXTRACTION         Past OB/Gyn History:  LMP - last week      Last Pap  10/2020 :  no abnormalities;    Family History  Family History   Problem Relation Age of Onset    No Known Problems Mother     Parkinsonism Father     Migraines Father     Migraines Sister     Brain cancer Sister 29    No Known Problems Maternal Aunt     No Known Problems Paternal Aunt     Substance Abuse Neg Hx     Mental illness Neg Hx        Social History:  Social History     Socioeconomic History    Marital status: Single     Spouse name: Not on file    Number of children: Not on file    Years of education: Not on file    Highest education level: Not on file   Occupational History    Not on file   Tobacco Use    Smoking status: Never    Smokeless tobacco: Never  Pl advise normal lab results  G/c was neg  And Remind her that I sent rx for cramping to her pharmacy  "  Vaping Use    Vaping status: Never Used   Substance and Sexual Activity    Alcohol use: No    Drug use: No    Sexual activity: Not Currently     Partners: Male   Other Topics Concern    Not on file   Social History Narrative    Always uses seatbelt    Caffeine use: 1 cup coffee    Daily caffeine consumption: 1 serving a day     Has smoke detectors     Social Determinants of Health     Financial Resource Strain: Not on file   Food Insecurity: Not on file   Transportation Needs: Not on file   Physical Activity: Not on file   Stress: Not on file   Social Connections: Not on file   Intimate Partner Violence: Not on file   Housing Stability: Not on file     Presently lives with family.  .     Allergies:  Allergies   Allergen Reactions    Other Anaphylaxis     Action Taken: Wasp and Yellow Jacket;        Medications:    Current Outpatient Medications:     B Complex Vitamins (VITAMIN B COMPLEX PO), Take 1 capsule by mouth, Disp: , Rfl:     cholecalciferol (VITAMIN D3) 1,000 units tablet, Take 4,000 Units by mouth daily, Disp: , Rfl:     levothyroxine 50 mcg tablet, Take 50 mcg by mouth daily, Disp: , Rfl:     MAGNESIUM PO, Take by mouth, Disp: , Rfl:     Multiple Vitamins-Minerals (MULTIVITAMIN PO), Take 1 capsule by mouth, Disp: , Rfl:     EPINEPHrine (EPIPEN) 0.3 mg/0.3 mL SOAJ, Inject as directed (Patient not taking: Reported on 12/8/2023), Disp: , Rfl:     Review of Systems:  Review of Systems   Constitutional: Negative.    HENT: Negative.     Eyes: Negative.    Respiratory: Negative.     Cardiovascular: Negative.    Gastrointestinal: Negative.    Endocrine: Negative.    Genitourinary: Negative.    Musculoskeletal: Negative.    Skin: Negative.    Allergic/Immunologic: Negative.    Neurological: Negative.    Hematological: Negative.    Psychiatric/Behavioral: Negative.           Physical Exam:  /70   Pulse 64   Temp 97.7 °F (36.5 °C) (Temporal)   Resp 16   Ht 5' 4\" (1.626 m)   Wt 61.7 kg (136 lb)   SpO2 " 99%   BMI 23.34 kg/m²    Physical Exam  Constitutional:       Appearance: Normal appearance. She is well-developed and normal weight.   Genitourinary:      Vulva normal.      No lesions in the vagina.      No inguinal adenopathy present in the right or left side.     No vaginal discharge, erythema, bleeding or ulceration.        Right Adnexa: not tender, not full and no mass present.     Left Adnexa: not tender, not full and no mass present.     No cervical motion tenderness, discharge, lesion or polyp.      Uterus is not enlarged or tender.      No uterine mass detected.  Breasts:     Right: Normal. No inverted nipple, mass, nipple discharge or skin change.      Left: Normal. No inverted nipple, mass, nipple discharge or skin change.   HENT:      Head: Normocephalic and atraumatic.   Neck:      Thyroid: No thyromegaly.   Cardiovascular:      Rate and Rhythm: Normal rate and regular rhythm.      Pulses: Normal pulses.      Heart sounds: Normal heart sounds. No murmur heard.  Pulmonary:      Effort: Pulmonary effort is normal. No respiratory distress.      Breath sounds: Normal breath sounds. No wheezing or rales.   Abdominal:      General: Abdomen is flat. Bowel sounds are normal. There is no distension.      Palpations: Abdomen is soft. There is no mass.      Hernia: No hernia is present. There is no hernia in the left inguinal area or right inguinal area.   Musculoskeletal:         General: Normal range of motion.      Cervical back: Normal range of motion and neck supple.   Lymphadenopathy:      Cervical: No cervical adenopathy.      Upper Body:      Right upper body: No supraclavicular adenopathy.      Left upper body: No supraclavicular adenopathy.      Lower Body: No right inguinal adenopathy. No left inguinal adenopathy.   Neurological:      General: No focal deficit present.      Mental Status: She is alert and oriented to person, place, and time.      Cranial Nerves: No cranial nerve deficit.      Deep  Tendon Reflexes: Reflexes are normal and symmetric.   Skin:     General: Skin is warm and dry.   Psychiatric:         Mood and Affect: Mood normal.         Behavior: Behavior normal.         Thought Content: Thought content normal.         Judgment: Judgment normal.

## 2024-02-21 PROBLEM — Z00.00 HEALTHCARE MAINTENANCE: Status: RESOLVED | Noted: 2018-04-02 | Resolved: 2024-02-21

## 2024-02-26 LAB
CYTOLOGIST CVX/VAG CYTO: NORMAL
DX ICD CODE: NORMAL
HPV GENOTYPE REFLEX: NORMAL
HPV I/H RISK 4 DNA CVX QL PROBE+SIG AMP: NEGATIVE
OTHER STN SPEC: NORMAL
PATH REPORT.FINAL DX SPEC: NORMAL
SL AMB NOTE:: NORMAL
SL AMB SPECIMEN ADEQUACY: NORMAL
SL AMB TEST METHODOLOGY: NORMAL

## 2024-04-23 ENCOUNTER — TELEPHONE (OUTPATIENT)
Age: 49
End: 2024-04-23

## 2024-04-23 NOTE — TELEPHONE ENCOUNTER
Kavita from [facility] endocrinology called requesting an insurance referral. Patient has [employer] insurance.    Patient is requesting an insurance referral for the following specialty:      Test Name / Order Name: endocrinology     DX Code: Hypothyroidism    Date Of Service: 05/22/2024    Location/Facility Name/Address/Phone #: [facility] endocrinology in [facility]# [id number]    Location / Facility NPI: [id number]    Best Phone # To Reach The Patient:    Please fax referral to [facility] at [phone]

## 2024-05-08 ENCOUNTER — VBI (OUTPATIENT)
Dept: ADMINISTRATIVE | Facility: OTHER | Age: 49
End: 2024-05-08

## 2024-05-22 ENCOUNTER — TELEPHONE (OUTPATIENT)
Age: 49
End: 2024-05-22

## 2024-05-22 NOTE — TELEPHONE ENCOUNTER
Patient called for update on insurance referral needed for visit 5/22 at endocrinology. Request was placed originally on 4/23.  Please contact patient.

## 2024-05-22 NOTE — TELEPHONE ENCOUNTER
Patient called stating she would like to be placed on low estrogen birth control.  She had recent pap smear.  Please contact when sent to pharmacy.

## 2024-05-23 DIAGNOSIS — E03.9 ACQUIRED HYPOTHYROIDISM: Primary | ICD-10-CM

## 2024-05-29 ENCOUNTER — OFFICE VISIT (OUTPATIENT)
Dept: FAMILY MEDICINE CLINIC | Facility: CLINIC | Age: 49
End: 2024-05-29
Payer: COMMERCIAL

## 2024-05-29 VITALS
SYSTOLIC BLOOD PRESSURE: 118 MMHG | WEIGHT: 134 LBS | DIASTOLIC BLOOD PRESSURE: 80 MMHG | TEMPERATURE: 97.8 F | OXYGEN SATURATION: 98 % | RESPIRATION RATE: 16 BRPM | HEIGHT: 65 IN | BODY MASS INDEX: 22.33 KG/M2 | HEART RATE: 57 BPM

## 2024-05-29 DIAGNOSIS — Z23 ENCOUNTER FOR IMMUNIZATION: ICD-10-CM

## 2024-05-29 DIAGNOSIS — N94.3 PMS (PREMENSTRUAL SYNDROME): Primary | ICD-10-CM

## 2024-05-29 DIAGNOSIS — T30.0 SUPERFICIAL BURN: ICD-10-CM

## 2024-05-29 DIAGNOSIS — L23.7 PLANT ALLERGIC CONTACT DERMATITIS: ICD-10-CM

## 2024-05-29 PROCEDURE — 90471 IMMUNIZATION ADMIN: CPT

## 2024-05-29 PROCEDURE — 99214 OFFICE O/P EST MOD 30 MIN: CPT | Performed by: PHYSICIAN ASSISTANT

## 2024-05-29 PROCEDURE — 90715 TDAP VACCINE 7 YRS/> IM: CPT

## 2024-05-29 RX ORDER — NORETHINDRONE ACETATE AND ETHINYL ESTRADIOL 1MG-20(21)
1 KIT ORAL DAILY
Qty: 84 TABLET | Refills: 3 | Status: SHIPPED | OUTPATIENT
Start: 2024-05-29

## 2024-05-29 NOTE — PROGRESS NOTES
Assessment/Plan:    PMS - increased recently, cycles consistently 22-24 days, recently sexually active, will try OCP loestrin daily, will have patient follow up in 3 months for BP check and FSH yearly    2.  Superficial burn - hot soup vs chemical? By history. left hand, advised to keep covered and moist with vaseline for healing, Tdap up dated today    3. Poison ivy/plant contact dermatitis - continue with OTC    F/u 3 months or sooner      Subjective:   Chief Complaint   Patient presents with    Rash     Rash on left hand  Not itchy but is painful  First noticed yesterday morning      Patient ID: Emma Andres is a 48 y.o. female.    Patient had poison ivy last week on left arm. Was itchy, a little swollen. Then left hand started with a burning sensation, now blistering on both hands. Admits she was cooking hot soup, possible burn vs cleaning with chemicals. Gardening a lot and now has some itchy spots on hands.    Is still getting regular periods, every 22-24 days. Getting some night sweats, always fatigued. Getting worse PMS, dramatic.         The following portions of the patient's history were reviewed and updated as appropriate: allergies, current medications, past family history, past medical history, past social history, past surgical history, and problem list.    Past Medical History:   Diagnosis Date    Broken arm     Migraine      Past Surgical History:   Procedure Laterality Date    FRACTURE SURGERY      Arm incision, fracture reset    WISDOM TOOTH EXTRACTION       Family History   Problem Relation Age of Onset    No Known Problems Mother     Parkinsonism Father     Migraines Father     Migraines Sister     Brain cancer Sister 29    No Known Problems Maternal Aunt     No Known Problems Paternal Aunt     Substance Abuse Neg Hx     Mental illness Neg Hx      Social History     Socioeconomic History    Marital status: Single     Spouse name: Not on file    Number of children: Not on file    Years of  "education: Not on file    Highest education level: Not on file   Occupational History    Not on file   Tobacco Use    Smoking status: Never    Smokeless tobacco: Never   Vaping Use    Vaping status: Never Used   Substance and Sexual Activity    Alcohol use: No    Drug use: No    Sexual activity: Not Currently     Partners: Male   Other Topics Concern    Not on file   Social History Narrative    Always uses seatbelt    Caffeine use: 1 cup coffee    Daily caffeine consumption: 1 serving a day     Has smoke detectors     Social Determinants of Health     Financial Resource Strain: Not on file   Food Insecurity: Not on file   Transportation Needs: Not on file   Physical Activity: Not on file   Stress: Not on file   Social Connections: Not on file   Intimate Partner Violence: Not on file   Housing Stability: Not on file       Current Outpatient Medications:     B Complex Vitamins (VITAMIN B COMPLEX PO), Take 1 capsule by mouth, Disp: , Rfl:     cholecalciferol (VITAMIN D3) 1,000 units tablet, Take 4,000 Units by mouth daily, Disp: , Rfl:     levothyroxine 50 mcg tablet, Take 50 mcg by mouth daily, Disp: , Rfl:     MAGNESIUM PO, Take by mouth, Disp: , Rfl:     Multiple Vitamins-Minerals (MULTIVITAMIN PO), Take 1 capsule by mouth, Disp: , Rfl:     EPINEPHrine (EPIPEN) 0.3 mg/0.3 mL SOAJ, Inject as directed (Patient not taking: Reported on 12/8/2023), Disp: , Rfl:     Review of Systems          Objective:    Vitals:    05/29/24 1043   BP: 118/80   Pulse: 57   Resp: 16   Temp: 97.8 °F (36.6 °C)   TempSrc: Temporal   SpO2: 98%   Weight: 60.8 kg (134 lb)   Height: 5' 5\" (1.651 m)        Physical Exam  Constitutional:       Appearance: Normal appearance. She is normal weight.   HENT:      Head: Normocephalic and atraumatic.   Skin:     Comments: Left thumb with erythema and 2 blisters about 4-5 mm in length    Other lesions on b/l hands, small vesicular lesions in linear formation   Neurological:      General: No focal deficit " present.      Mental Status: She is alert and oriented to person, place, and time.   Psychiatric:         Mood and Affect: Mood normal.         Behavior: Behavior normal.         Thought Content: Thought content normal.         Judgment: Judgment normal.

## 2024-08-02 ENCOUNTER — HOSPITAL ENCOUNTER (OUTPATIENT)
Dept: MAMMOGRAPHY | Facility: CLINIC | Age: 49
Discharge: HOME | End: 2024-08-02
Payer: COMMERCIAL

## 2024-08-02 VITALS — BODY MASS INDEX: 22.33 KG/M2 | HEIGHT: 65 IN | WEIGHT: 134 LBS

## 2024-08-02 DIAGNOSIS — Z12.31 ENCOUNTER FOR SCREENING MAMMOGRAM FOR BREAST CANCER: ICD-10-CM

## 2024-08-02 PROCEDURE — 77067 SCR MAMMO BI INCL CAD: CPT

## 2024-08-02 PROCEDURE — 77063 BREAST TOMOSYNTHESIS BI: CPT

## 2024-08-23 ENCOUNTER — PREP FOR PROCEDURE (OUTPATIENT)
Age: 49
End: 2024-08-23

## 2024-08-23 ENCOUNTER — TELEPHONE (OUTPATIENT)
Age: 49
End: 2024-08-23

## 2024-08-23 DIAGNOSIS — Z12.11 SCREENING FOR COLON CANCER: Primary | ICD-10-CM

## 2024-08-23 NOTE — TELEPHONE ENCOUNTER
Scheduled date of colonoscopy (as of today): 1/22/25  Physician performing colonoscopy: Dr. Ramirez  Location of colonoscopy: [facility]  Bowel prep reviewed with patient: Oliver  Instructions reviewed with patient by: Brenda KWOK, sent via [employer], pt aware  Clearances: N/A    Pt stated a few years ago she was checked by cardio for heart palpations and wore a monitor. Per pt everything was okay and she is not under the care of cardio.

## 2024-08-23 NOTE — TELEPHONE ENCOUNTER
08/23/24  Screened by: Brenda Shearer MA    Referring Provider     Pre- Screening:     There is no height or weight on file to calculate BMI.  Has patient been referred for a routine screening Colonoscopy? yes  Is the patient between 45-75 years old? yes      Previous Colonoscopy no   If yes:    Date:     Facility:     Reason:       Does the patient want to see a Gastroenterologist prior to their procedure OR are they having any GI symptoms? no    Has the patient been hospitalized or had abdominal surgery in the past 6 months? no    Does the patient use supplemental oxygen? no    Does the patient take Coumadin, Lovenox, Plavix, Elliquis, Xarelto, or other blood thinning medication? no    Has the patient had a stroke, cardiac event, or stent placed in the past year? no      If patient is between 45yrs - 49yrs, please advise patient that we will have to confirm benefits & coverage with their insurance company for a routine screening colonoscopy.

## 2024-08-28 ENCOUNTER — OFFICE VISIT (OUTPATIENT)
Dept: FAMILY MEDICINE CLINIC | Facility: CLINIC | Age: 49
End: 2024-08-28
Payer: COMMERCIAL

## 2024-08-28 VITALS
SYSTOLIC BLOOD PRESSURE: 114 MMHG | RESPIRATION RATE: 16 BRPM | TEMPERATURE: 98.4 F | WEIGHT: 137 LBS | HEART RATE: 72 BPM | OXYGEN SATURATION: 98 % | DIASTOLIC BLOOD PRESSURE: 76 MMHG | HEIGHT: 65 IN | BODY MASS INDEX: 22.82 KG/M2

## 2024-08-28 DIAGNOSIS — N94.3 PMS (PREMENSTRUAL SYNDROME): Primary | ICD-10-CM

## 2024-08-28 PROCEDURE — 99213 OFFICE O/P EST LOW 20 MIN: CPT | Performed by: PHYSICIAN ASSISTANT

## 2024-08-28 RX ORDER — NORETHINDRONE ACETATE AND ETHINYL ESTRADIOL, ETHINYL ESTRADIOL AND FERROUS FUMARATE 1MG-10(24)
1 KIT ORAL DAILY
Qty: 84 TABLET | Refills: 1 | Status: SHIPPED | OUTPATIENT
Start: 2024-08-28

## 2024-08-28 NOTE — PROGRESS NOTES
Assessment/Plan:    PMS  - well controlled with OCP however now having palpitations, holter/echo normal 12/2022, labs normal 2/2024, no change in ADL, has gotten more significant since starting OCP. Will try lo loestrin to see if this helps for 2 months, if it does not consider progesterone only    F/u as needed    Subjective:   Chief Complaint   Patient presents with    Blood Pressure Check      Patient ID: Emma Andres is a 49 y.o. female.    Since starting OCP notes she is sleeping better but has started with palpitations. Has gradually gotten more significant. Only doing 1 scoop of caffeine coffee daily, almost at all decaf. Drinking water.        The following portions of the patient's history were reviewed and updated as appropriate: allergies, current medications, past family history, past medical history, past social history, past surgical history, and problem list.    Past Medical History:   Diagnosis Date    Broken arm     Migraine      Past Surgical History:   Procedure Laterality Date    FRACTURE SURGERY      Arm incision, fracture reset    WISDOM TOOTH EXTRACTION       Family History   Problem Relation Age of Onset    No Known Problems Mother     Parkinsonism Father     Migraines Father     Migraines Sister     Brain cancer Sister 29    No Known Problems Maternal Aunt     No Known Problems Paternal Aunt     Substance Abuse Neg Hx     Mental illness Neg Hx      Social History     Socioeconomic History    Marital status: Single     Spouse name: Not on file    Number of children: Not on file    Years of education: Not on file    Highest education level: Not on file   Occupational History    Not on file   Tobacco Use    Smoking status: Never    Smokeless tobacco: Never   Vaping Use    Vaping status: Never Used   Substance and Sexual Activity    Alcohol use: No    Drug use: No    Sexual activity: Not Currently     Partners: Male   Other Topics Concern    Not on file   Social History Narrative    Always uses  "seatbelt    Caffeine use: 1 cup coffee    Daily caffeine consumption: 1 serving a day     Has smoke detectors     Social Determinants of Health     Financial Resource Strain: Not on file   Food Insecurity: Not on file   Transportation Needs: Not on file   Physical Activity: Not on file   Stress: Not on file   Social Connections: Not on file   Intimate Partner Violence: Not on file   Housing Stability: Not on file       Current Outpatient Medications:     B Complex Vitamins (VITAMIN B COMPLEX PO), Take 1 capsule by mouth, Disp: , Rfl:     cholecalciferol (VITAMIN D3) 1,000 units tablet, Take 4,000 Units by mouth daily, Disp: , Rfl:     levothyroxine 50 mcg tablet, Take 50 mcg by mouth daily, Disp: , Rfl:     MAGNESIUM PO, Take by mouth, Disp: , Rfl:     Multiple Vitamins-Minerals (MULTIVITAMIN PO), Take 1 capsule by mouth, Disp: , Rfl:     norethindrone-ethinyl estradiol (Loestrin Fe 1/20) 1-20 MG-MCG per tablet, Take 1 tablet by mouth daily, Disp: 84 tablet, Rfl: 3    EPINEPHrine (EPIPEN) 0.3 mg/0.3 mL SOAJ, Inject as directed (Patient not taking: Reported on 12/8/2023), Disp: , Rfl:     Review of Systems          Objective:    Vitals:    08/28/24 1037   BP: 114/76   Pulse: 72   Resp: 16   Temp: 98.4 °F (36.9 °C)   TempSrc: Temporal   SpO2: 98%   Weight: 62.1 kg (137 lb)   Height: 5' 5\" (1.651 m)        Physical Exam  Constitutional:       Appearance: Normal appearance. She is well-developed and normal weight.   HENT:      Head: Normocephalic and atraumatic.   Neck:      Vascular: No carotid bruit.   Cardiovascular:      Rate and Rhythm: Normal rate and regular rhythm.      Pulses: Normal pulses.      Heart sounds: Normal heart sounds. No murmur heard.     No friction rub. No gallop.   Pulmonary:      Effort: Pulmonary effort is normal.      Breath sounds: Normal breath sounds.   Musculoskeletal:         General: Normal range of motion.      Cervical back: Normal range of motion and neck supple.      Right lower " leg: No edema.      Left lower leg: No edema.   Skin:     General: Skin is warm.   Neurological:      General: No focal deficit present.      Mental Status: She is alert and oriented to person, place, and time.   Psychiatric:         Mood and Affect: Mood normal.         Behavior: Behavior normal.         Thought Content: Thought content normal.         Judgment: Judgment normal.

## 2024-10-14 ENCOUNTER — OFFICE VISIT (OUTPATIENT)
Dept: FAMILY MEDICINE CLINIC | Facility: CLINIC | Age: 49
End: 2024-10-14
Payer: COMMERCIAL

## 2024-10-14 VITALS
TEMPERATURE: 96.2 F | HEART RATE: 73 BPM | HEIGHT: 65 IN | OXYGEN SATURATION: 96 % | BODY MASS INDEX: 22.66 KG/M2 | WEIGHT: 136 LBS | DIASTOLIC BLOOD PRESSURE: 64 MMHG | RESPIRATION RATE: 14 BRPM | SYSTOLIC BLOOD PRESSURE: 100 MMHG

## 2024-10-14 DIAGNOSIS — N95.1 PERIMENOPAUSE: Primary | ICD-10-CM

## 2024-10-14 PROCEDURE — 99214 OFFICE O/P EST MOD 30 MIN: CPT | Performed by: PHYSICIAN ASSISTANT

## 2024-10-14 NOTE — PROGRESS NOTES
Assessment/Plan:    Palpitations - possibly due to perimenopause vs estrogen in OCP, EKG NSR today, echo and holter from 2022, holter with mild supraventricular topic activity, labs 2/2024 normal, see perimenopause specialist first, consider cardiology eval    Perimenopause - could not tolerate estrogen/progesterone OCP, regular and low both caused palpitations, refer to [facility] perimenopause specialist    F/u as needed    Subjective:   Chief Complaint   Patient presents with    Medication Management     Wants to discuss Birth Control      Patient ID: Emma Andres is a 49 y.o. female.    Patient tried lo loestrin and was getting palpitations again so stopped. Periods are still regular, but getting significant symptoms leading up with heavy flow.     Palpitations from 2020. Had echo and palpitations back in 2022. Come and go, more persistent with OCP. Notes at rest, with activity. Feels the beats here and there does not notice in a run.        The following portions of the patient's history were reviewed and updated as appropriate: allergies, current medications, past family history, past medical history, past social history, past surgical history, and problem list.    Past Medical History:   Diagnosis Date    Broken arm     Migraine      Past Surgical History:   Procedure Laterality Date    FRACTURE SURGERY      Arm incision, fracture reset    WISDOM TOOTH EXTRACTION       Family History   Problem Relation Age of Onset    No Known Problems Mother     Parkinsonism Father     Migraines Father     Migraines Sister     Brain cancer Sister 29    No Known Problems Maternal Aunt     No Known Problems Paternal Aunt     Substance Abuse Neg Hx     Mental illness Neg Hx      Social History     Socioeconomic History    Marital status: Single     Spouse name: Not on file    Number of children: Not on file    Years of education: Not on file    Highest education level: Not on file   Occupational History    Not on file   Tobacco  "Use    Smoking status: Never    Smokeless tobacco: Never   Vaping Use    Vaping status: Never Used   Substance and Sexual Activity    Alcohol use: No    Drug use: No    Sexual activity: Yes     Partners: Male     Birth control/protection: OCP   Other Topics Concern    Not on file   Social History Narrative    Always uses seatbelt    Caffeine use: 1 cup coffee    Daily caffeine consumption: 1 serving a day     Has smoke detectors     Social Determinants of Health     Financial Resource Strain: Not on file   Food Insecurity: Not on file   Transportation Needs: Not on file   Physical Activity: Not on file   Stress: Not on file   Social Connections: Not on file   Intimate Partner Violence: Not on file   Housing Stability: Not on file       Current Outpatient Medications:     B Complex Vitamins (VITAMIN B COMPLEX PO), Take 1 capsule by mouth, Disp: , Rfl:     cholecalciferol (VITAMIN D3) 1,000 units tablet, Take 4,000 Units by mouth daily, Disp: , Rfl:     levothyroxine 50 mcg tablet, Take 50 mcg by mouth daily, Disp: , Rfl:     MAGNESIUM PO, Take by mouth, Disp: , Rfl:     Multiple Vitamins-Minerals (MULTIVITAMIN PO), Take 1 capsule by mouth, Disp: , Rfl:     EPINEPHrine (EPIPEN) 0.3 mg/0.3 mL SOAJ, Inject as directed (Patient not taking: Reported on 12/8/2023), Disp: , Rfl:     Norethin-Eth Estrad-Fe Biphas (Lo Loestrin Fe) 1 MG-10 MCG / 10 MCG TABS, Take 1 tablet by mouth in the morning (Patient not taking: Reported on 10/14/2024), Disp: 84 tablet, Rfl: 1    Review of Systems          Objective:    Vitals:    10/14/24 1223   BP: 100/64   Pulse: 73   Resp: 14   Temp: (!) 96.2 °F (35.7 °C)   SpO2: 96%   Weight: 61.7 kg (136 lb)   Height: 5' 5\" (1.651 m)        Physical Exam  Constitutional:       Appearance: Normal appearance. She is well-developed and normal weight.   HENT:      Head: Normocephalic and atraumatic.   Neck:      Vascular: No carotid bruit.   Cardiovascular:      Rate and Rhythm: Normal rate and regular " rhythm.      Pulses: Normal pulses.      Heart sounds: Normal heart sounds.      Comments: PVC noted on exam not picked up on EKG  Pulmonary:      Effort: Pulmonary effort is normal.      Breath sounds: Normal breath sounds.   Musculoskeletal:         General: Normal range of motion.      Cervical back: Normal range of motion and neck supple.   Skin:     General: Skin is warm.   Neurological:      General: No focal deficit present.      Mental Status: She is alert and oriented to person, place, and time.   Psychiatric:         Mood and Affect: Mood normal.         Behavior: Behavior normal.         Thought Content: Thought content normal.         Judgment: Judgment normal.           I have spent a total time of 30 minutes in caring for this patient on the day of the visit/encounter including Risks and benefits of tx options, Instructions for management, Importance of tx compliance, Risk factor reductions, Impressions, Counseling / Coordination of care, and Reviewing / ordering tests, medicine, procedures  .

## 2024-10-18 ENCOUNTER — HOSPITAL ENCOUNTER (OUTPATIENT)
Dept: ULTRASOUND IMAGING | Facility: HOSPITAL | Age: 49
Discharge: HOME | End: 2024-10-18
Payer: COMMERCIAL

## 2024-10-18 DIAGNOSIS — N92.0 SPOTTING: ICD-10-CM

## 2024-10-18 DIAGNOSIS — R19.00 PELVIC FULLNESS: ICD-10-CM

## 2024-10-18 PROCEDURE — 76830 TRANSVAGINAL US NON-OB: CPT

## 2024-10-18 PROCEDURE — 76856 US EXAM PELVIC COMPLETE: CPT

## 2024-10-31 ENCOUNTER — OFFICE VISIT (OUTPATIENT)
Dept: OBGYN CLINIC | Facility: CLINIC | Age: 49
End: 2024-10-31
Payer: COMMERCIAL

## 2024-10-31 VITALS
WEIGHT: 133 LBS | BODY MASS INDEX: 22.16 KG/M2 | DIASTOLIC BLOOD PRESSURE: 64 MMHG | HEIGHT: 65 IN | SYSTOLIC BLOOD PRESSURE: 92 MMHG

## 2024-10-31 DIAGNOSIS — N80.03 ADENOMYOSIS: ICD-10-CM

## 2024-10-31 DIAGNOSIS — N94.6 DYSMENORRHEA: ICD-10-CM

## 2024-10-31 DIAGNOSIS — N95.1 PERIMENOPAUSE: ICD-10-CM

## 2024-10-31 DIAGNOSIS — N94.3 PMS (PREMENSTRUAL SYNDROME): Primary | ICD-10-CM

## 2024-10-31 PROCEDURE — 99204 OFFICE O/P NEW MOD 45 MIN: CPT | Performed by: OBSTETRICS & GYNECOLOGY

## 2024-10-31 RX ORDER — ACETAMINOPHEN AND CODEINE PHOSPHATE 120; 12 MG/5ML; MG/5ML
1 SOLUTION ORAL DAILY
Qty: 28 TABLET | Refills: 3 | Status: SHIPPED | OUTPATIENT
Start: 2024-10-31 | End: 2025-02-20

## 2024-11-06 ENCOUNTER — TELEPHONE (OUTPATIENT)
Age: 49
End: 2024-11-06

## 2024-11-06 NOTE — TELEPHONE ENCOUNTER
Pt called. Went to see GYN Provider on 10/31. Doesn't feel that the Provider is a good fit. Would like to see another GYN Provider. Pt would like to know if she needs another referral or if the same one can be used? Also, PCP had recommended a GYN Provider previously, but Pt selected another Provider due to scheduling delay. Pt would like to have that Provider's contact information again.      Please follow up with Pt via My Chart message or leave detailed VM as per her request.

## 2024-12-09 NOTE — PROGRESS NOTES
Assessment/Plan:      Diagnoses and all orders for this visit:    PMS (premenstrual syndrome)  -  management with SSRI or contraceptives reviewed.  R&B and indications for each reviewed.  -  pt declines estrogen containing options, agrees to start progesterone only options  -     norethindrone (Renu) 0.35 MG tablet; Take 1 tablet (0.35 mg total) by mouth daily    Perimenopause  -   Si/Sx of perimenopause and menopause reviewed.  -  Management with estrogen-progesterone reviewed.  Pt declined  -   Ambulatory Referral to Obstetrics / Gynecology    Dysmenorrhea  -  management with Motrin/Advil PRN or contraception reviewed.  -  Pt desire Progestin only contraception  -  norethindrone (Renu) 0.35 MG tablet; Take 1 tablet (0.35 mg total) by mouth daily    Adenomyosis         -  U/S results reviewed with patient and significance of diagnosis of Adenomyosis reviewed.       -  Advised to keep menstrual calendar and if dysmenorrhea decreases with progesterone only OCPs      I have spent a total time of 60 minutes in caring for this patient on the day of the visit/encounter including Diagnostic results, Prognosis, Risks and benefits of tx options, Instructions for management, Patient and family education, Importance of tx compliance, Risk factor reductions, Impressions, Counseling / Coordination of care, Documenting in the medical record, Reviewing / ordering tests, medicine, procedures  , and Obtaining or reviewing history  .   Subjective:     Patient ID: Emma Andres is a 49 y.o. female.    New patient presents to review U/S results .  She also concerned with having dysmenorrhea, PMS and perimenopausal symptoms        Review of Systems   Constitutional:  Negative for activity change and unexpected weight change.   Genitourinary:  Negative for dysuria, genital sores, pelvic pain, vaginal bleeding, vaginal discharge and vaginal pain.         Objective:     Physical Exam

## 2024-12-30 ENCOUNTER — TELEPHONE (OUTPATIENT)
Dept: GASTROENTEROLOGY | Facility: CLINIC | Age: 49
End: 2024-12-30

## 2024-12-30 NOTE — TELEPHONE ENCOUNTER
12/30/24- Sent a MY CHART to the patient, Dr. Ramirez is not at [facility] the day of her procedure 01/22/25, the Dr. Sweeney will be there - DS

## 2025-01-03 ENCOUNTER — OFFICE VISIT (OUTPATIENT)
Dept: FAMILY MEDICINE CLINIC | Facility: CLINIC | Age: 50
End: 2025-01-03
Payer: COMMERCIAL

## 2025-01-03 VITALS
BODY MASS INDEX: 23.69 KG/M2 | DIASTOLIC BLOOD PRESSURE: 74 MMHG | OXYGEN SATURATION: 100 % | TEMPERATURE: 97.9 F | RESPIRATION RATE: 16 BRPM | HEART RATE: 80 BPM | HEIGHT: 65 IN | WEIGHT: 142.2 LBS | SYSTOLIC BLOOD PRESSURE: 100 MMHG

## 2025-01-03 DIAGNOSIS — R09.81 NASAL CONGESTION: ICD-10-CM

## 2025-01-03 DIAGNOSIS — J06.9 VIRAL URI WITH COUGH: Primary | ICD-10-CM

## 2025-01-03 LAB
S PYO AG THROAT QL: NEGATIVE
SARS-COV-2 AG UPPER RESP QL IA: NEGATIVE
VALID CONTROL: NORMAL

## 2025-01-03 PROCEDURE — 99213 OFFICE O/P EST LOW 20 MIN: CPT | Performed by: STUDENT IN AN ORGANIZED HEALTH CARE EDUCATION/TRAINING PROGRAM

## 2025-01-03 PROCEDURE — 87880 STREP A ASSAY W/OPTIC: CPT | Performed by: STUDENT IN AN ORGANIZED HEALTH CARE EDUCATION/TRAINING PROGRAM

## 2025-01-03 PROCEDURE — 87811 SARS-COV-2 COVID19 W/OPTIC: CPT | Performed by: STUDENT IN AN ORGANIZED HEALTH CARE EDUCATION/TRAINING PROGRAM

## 2025-01-03 NOTE — PROGRESS NOTES
"Name: Emma Andres      : 1975      MRN: [id number]  Encounter Provider: Bella Patel MD  Encounter Date: 1/3/2025   Encounter department: [facility]  :  Assessment & Plan  Nasal congestion  Rapid strep and COVID in office are negative  Orders:    POCT Rapid Covid Ag    POCT rapid ANTIGEN strepA    Viral URI with cough  Discussed with patient likely viral in nature continue with symptom directed treatment over-the-counter including DayQuil NyQuil Mucinex and hydration  Patient reports understanding     Follow-up as needed if symptoms worsen or do not improve         History of Present Illness     Emma is a 49-year-old female who presents to the office today for sick visit.  Patient reports over the last week she has had cough congestion for which she was using Mucinex however on [place] '[place] her cough has become more dry happening more at night and especially when she is outdoors.  Patient denies any fever however has other family members with similar symptoms who have also been diagnosed with viral illnesses.      Review of Systems   Constitutional:  Negative for chills and fever.   HENT:  Positive for congestion. Negative for sinus pressure and sore throat.    Respiratory:  Positive for cough.    Cardiovascular:  Negative for chest pain.       Objective   /74 (Patient Position: Sitting, Cuff Size: Standard)   Pulse 80   Temp 97.9 °F (36.6 °C) (Temporal)   Resp 16   Ht 5' 5\" (1.651 m)   Wt 64.5 kg (142 lb 3.2 oz)   SpO2 100%   BMI 23.66 kg/m²      Physical Exam  Vitals reviewed.   HENT:      Right Ear: Tympanic membrane and ear canal normal.      Left Ear: Tympanic membrane and ear canal normal.      Mouth/Throat:      Mouth: Mucous membranes are moist.      Pharynx: Oropharynx is clear.   Cardiovascular:      Rate and Rhythm: Normal rate.      Pulses: Normal pulses.   Pulmonary:      Effort: Pulmonary effort is normal. No respiratory " distress.      Breath sounds: Normal breath sounds. No wheezing.   Neurological:      Mental Status: She is alert.       Administrative Statements   I have spent a total time of 20 minutes in caring for this patient on the day of the visit/encounter including Instructions for management, Patient and family education, Risk factor reductions, Counseling / Coordination of care, Documenting in the medical record, and Obtaining or reviewing history  .

## 2025-01-21 NOTE — PROGRESS NOTES
Assessment/Plan:    Discussed menopause/perimenopause/hormonal fluctuations at 49/need for BC. Will trial Nuva Ring continuously, inserting a new ring every 3 weeks. Pt to monitor for heart palpitations and will remove if they occur. Discussed risk factors, [place] blood clot and stroke in light of age 49. Other risk factors/benefits/instructions for use reviewed. Will RTO in 3 month for med/BP check, but instructed to notify the office of any concerns prior to that.     I have spent a total time of 45 minutes in caring for this patient on the day of the visit/encounter including Diagnostic results, Prognosis, Risks and benefits of tx options, Instructions for management, Patient and family education, Importance of tx compliance, Risk factor reductions, Impressions, Counseling / Coordination of care, Documenting in the medical record, Reviewing / ordering tests, medicine, procedures  , and Obtaining or reviewing history  .      Diagnoses and all orders for this visit:    Perimenopause    Amenorrhea  -     POCT urine HCG    Encounter for initial prescription of vaginal ring hormonal contraceptive  -     etonogestrel-ethinyl estradiol (NuvaRing) 0.12-0.015 MG/24HR vaginal ring; WILL USE CONTINUOUSLY, INSERTING A NEW RING EVERY 3 WEEKS        Subjective:      Patient ID: Emma Andres is a 49 y.o. female.    Pt presents for menopause consult.  LMP 12/26/24, continues to menstruate regularly, missed one cycle months ago. She states her sister was 53 and mom was 55 when they stopped menstruating.   TVU 10/2024 suggestive of adenomyosis. Denies painful menses.  TSH normal 2/2024. She is on 50 mcgs of synthroid and has script for repeat blood work.     Went on REAGAN 4/2024 for birth control purposes. Started with heart palpitations which resolved when she stopped REAGAN. Started POP in 11/2024 and then had dizziness that lasted a week  and she stopped taking it and felt fine after a week.   Sexually active for 1 year with  "boyfriend.   UPT in office neg    When discussing consuelo/postmenopausal sx, pt states she is not experiencing any of these sx, but wanted to be educated on consuelo/menopause.      Pap 10/12/20  Mammo 8/2/24  Colonoscopy scheduled for 2/11/25        The following portions of the patient's history were reviewed and updated as appropriate: allergies, current medications, past family history, past medical history, past social history, past surgical history and problem list.    Review of Systems   Constitutional: Negative.    Respiratory: Negative.     Cardiovascular: Negative.    Gastrointestinal: Negative.    Endocrine: Negative.    Genitourinary:  Negative for dyspareunia, dysuria, frequency, pelvic pain, urgency, vaginal bleeding, vaginal discharge and vaginal pain.   Musculoskeletal: Negative.    Skin: Negative.    Neurological: Negative.    Psychiatric/Behavioral: Negative.           Objective:      /74 (BP Location: Left arm, Patient Position: Sitting, Cuff Size: Standard)   Ht 5' 5\" (1.651 m)   Wt 63 kg (138 lb 12.8 oz)   LMP 12/26/2024 (Approximate)   BMI 23.10 kg/m²          Physical Exam  Vitals and nursing note reviewed.   Constitutional:       Appearance: Normal appearance.   HENT:      Head: Normocephalic and atraumatic.   Pulmonary:      Effort: Pulmonary effort is normal.   Musculoskeletal:         General: Normal range of motion.      Cervical back: Normal range of motion.   Skin:     General: Skin is warm and dry.   Neurological:      Mental Status: She is alert and oriented to person, place, and time.   Psychiatric:         Mood and Affect: Mood normal.         Behavior: Behavior normal.         Thought Content: Thought content normal.         Judgment: Judgment normal.         "

## 2025-01-22 ENCOUNTER — OFFICE VISIT (OUTPATIENT)
Dept: GYNECOLOGY | Facility: CLINIC | Age: 50
End: 2025-01-22
Payer: COMMERCIAL

## 2025-01-22 ENCOUNTER — TELEPHONE (OUTPATIENT)
Age: 50
End: 2025-01-22

## 2025-01-22 VITALS
WEIGHT: 138.8 LBS | BODY MASS INDEX: 23.13 KG/M2 | DIASTOLIC BLOOD PRESSURE: 74 MMHG | HEIGHT: 65 IN | SYSTOLIC BLOOD PRESSURE: 102 MMHG

## 2025-01-22 DIAGNOSIS — N91.2 AMENORRHEA: ICD-10-CM

## 2025-01-22 DIAGNOSIS — Z30.015 ENCOUNTER FOR INITIAL PRESCRIPTION OF VAGINAL RING HORMONAL CONTRACEPTIVE: ICD-10-CM

## 2025-01-22 DIAGNOSIS — N95.1 PERIMENOPAUSE: Primary | ICD-10-CM

## 2025-01-22 LAB — SL AMB POCT URINE HCG: NORMAL

## 2025-01-22 PROCEDURE — 81025 URINE PREGNANCY TEST: CPT | Performed by: OBSTETRICS & GYNECOLOGY

## 2025-01-22 PROCEDURE — 99215 OFFICE O/P EST HI 40 MIN: CPT | Performed by: OBSTETRICS & GYNECOLOGY

## 2025-01-22 RX ORDER — ETONOGESTREL AND ETHINYL ESTRADIOL VAGINAL RING .015; .12 MG/D; MG/D
RING VAGINAL
Qty: 5 EACH | Refills: 0 | Status: SHIPPED | OUTPATIENT
Start: 2025-01-22 | End: 2025-01-24

## 2025-01-22 NOTE — LETTER
2025  ATTN: [employer] Appeals Dept     Patient: Emma Andres :1975 Member ID:[id number]    Re: Request for Reconsideration of NuvaRing 0.12-0.015 MG/24HR      To Whom It May Concern:   Emma Andres 1975 was denied coverage for the medication NuvaRing on 2025. The denial reason was stated as not covered due to not meeting insurance criteria. I am requesting a redetermination of the denial of coverage as my patient has tried and failed combined and progesterone only contraceptives due to side effects. While taking combined oral contraceptives Emma experienced heart palpitations, with progesterone only contraceptives she had dizziness.      In conclusion, please reconsider the denial of NuvaRing  for my patient. I would appreciate prompt review of this appeal and approval of this FDA-approved medication. I can be reached by phone at [phone] or via fax at [phone] for additional information and discussion. Thank you.      Sincerely,   HUSSAIN Nguyen

## 2025-01-22 NOTE — TELEPHONE ENCOUNTER
PA for NuvaRing SUBMITTED to [employer]    via    []CMM-KEY:   []Surescripts-Case ID #   []Availity-Auth ID # NDC #   [x]Faxed to plan   []Other website   []Phone call Case ID #     []PA sent as URGENT    All office notes, labs and other pertaining documents and studies sent. Clinical questions answered. Awaiting determination from insurance company.     Turnaround time for your insurance to make a decision on your Prior Authorization can take 7-21 business days.

## 2025-01-22 NOTE — TELEPHONE ENCOUNTER
Incoming call from [facility]. States that prescription for Nuvaring requires prior authorization as written: use continuously.     Routing to PA team.

## 2025-01-24 DIAGNOSIS — Z30.015 ENCOUNTER FOR INITIAL PRESCRIPTION OF VAGINAL RING HORMONAL CONTRACEPTIVE: Primary | ICD-10-CM

## 2025-01-24 RX ORDER — ETONOGESTREL AND ETHINYL ESTRADIOL VAGINAL RING .015; .12 MG/D; MG/D
RING VAGINAL
Qty: 5 EACH | Refills: 1 | Status: SHIPPED | OUTPATIENT
Start: 2025-01-24

## 2025-01-24 NOTE — TELEPHONE ENCOUNTER
Appeal should be started using my last note for clinical documentation as pt did not tolerate OCPs. Please inform pt of appeal

## 2025-01-24 NOTE — TELEPHONE ENCOUNTER
PA for (NuvaRing) 0.12-0.015 MG/24HR DENIED    Reason:(Screenshot if applicable)        Message sent to office clinical pool Yes    Denial letter scanned into Media Yes    Appeal started No (Provider will need to decide if appeal is warranted and send clinical documentation to Prior Authorization Team for initiation.)    **Please follow up with your patient regarding denial and next steps**

## 2025-01-27 ENCOUNTER — TELEPHONE (OUTPATIENT)
Age: 50
End: 2025-01-27

## 2025-01-27 NOTE — TELEPHONE ENCOUNTER
Keshawn from Prior Auth dept calling to clarify prescription appeal.  Warm transfer to Parvin to assist.   
I personally spent

## 2025-01-28 ENCOUNTER — TELEPHONE (OUTPATIENT)
Dept: GYNECOLOGY | Facility: CLINIC | Age: 50
End: 2025-01-28

## 2025-01-29 ENCOUNTER — TELEPHONE (OUTPATIENT)
Dept: GASTROENTEROLOGY | Facility: CLINIC | Age: 50
End: 2025-01-29

## 2025-01-30 NOTE — TELEPHONE ENCOUNTER
Received fax from insurance stating they cannot process appeal without a signed consent form from the patient. Will attached form provided from insurance in [employer] message for patient to sign. If the patient is unable to sign electronically she will need to come into the office to sign.

## 2025-02-11 ENCOUNTER — ANESTHESIA EVENT (OUTPATIENT)
Dept: GASTROENTEROLOGY | Facility: HOSPITAL | Age: 50
End: 2025-02-11
Payer: COMMERCIAL

## 2025-02-11 ENCOUNTER — HOSPITAL ENCOUNTER (OUTPATIENT)
Dept: GASTROENTEROLOGY | Facility: HOSPITAL | Age: 50
Setting detail: OUTPATIENT SURGERY
Discharge: HOME | End: 2025-02-11
Attending: INTERNAL MEDICINE
Payer: COMMERCIAL

## 2025-02-11 ENCOUNTER — ANESTHESIA (OUTPATIENT)
Dept: GASTROENTEROLOGY | Facility: HOSPITAL | Age: 50
End: 2025-02-11
Payer: COMMERCIAL

## 2025-02-11 VITALS
DIASTOLIC BLOOD PRESSURE: 67 MMHG | RESPIRATION RATE: 14 BRPM | OXYGEN SATURATION: 100 % | TEMPERATURE: 97.9 F | HEART RATE: 49 BPM | SYSTOLIC BLOOD PRESSURE: 118 MMHG

## 2025-02-11 DIAGNOSIS — Z12.11 SCREENING FOR COLON CANCER: ICD-10-CM

## 2025-02-11 LAB
EXT PREGNANCY TEST URINE: NEGATIVE
EXT. CONTROL: NORMAL

## 2025-02-11 PROCEDURE — 81025 URINE PREGNANCY TEST: CPT | Performed by: INTERNAL MEDICINE

## 2025-02-11 PROCEDURE — 45381 COLONOSCOPY SUBMUCOUS NJX: CPT | Performed by: INTERNAL MEDICINE

## 2025-02-11 PROCEDURE — 45385 COLONOSCOPY W/LESION REMOVAL: CPT | Performed by: INTERNAL MEDICINE

## 2025-02-11 PROCEDURE — 88305 TISSUE EXAM BY PATHOLOGIST: CPT | Performed by: PATHOLOGY

## 2025-02-11 RX ORDER — PROPOFOL 10 MG/ML
INJECTION, EMULSION INTRAVENOUS CONTINUOUS PRN
Status: DISCONTINUED | OUTPATIENT
Start: 2025-02-11 | End: 2025-02-11

## 2025-02-11 RX ORDER — SODIUM CHLORIDE 9 MG/ML
INJECTION, SOLUTION INTRAVENOUS CONTINUOUS PRN
Status: DISCONTINUED | OUTPATIENT
Start: 2025-02-11 | End: 2025-02-11

## 2025-02-11 RX ORDER — PROPOFOL 10 MG/ML
INJECTION, EMULSION INTRAVENOUS AS NEEDED
Status: DISCONTINUED | OUTPATIENT
Start: 2025-02-11 | End: 2025-02-11

## 2025-02-11 RX ADMIN — PROPOFOL 150 MG: 10 INJECTION, EMULSION INTRAVENOUS at 09:26

## 2025-02-11 RX ADMIN — PROPOFOL 100 MCG/KG/MIN: 10 INJECTION, EMULSION INTRAVENOUS at 09:26

## 2025-02-11 RX ADMIN — SODIUM CHLORIDE: 0.9 INJECTION, SOLUTION INTRAVENOUS at 07:33

## 2025-02-11 NOTE — ANESTHESIA PREPROCEDURE EVALUATION
Procedure:  COLONOSCOPY    Relevant Problems   ANESTHESIA (within normal limits)      CARDIO (within normal limits)      ENDO   (+) Acquired hypothyroidism      GI/HEPATIC (within normal limits)      /RENAL (within normal limits)      GYN (within normal limits)      HEMATOLOGY (within normal limits)      MUSCULOSKELETAL (within normal limits)      NEURO/PSYCH (within normal limits)      PULMONARY (within normal limits)      Endocrine   (+) Thyroid nodule      Ear/Nose/Throat   (+) Allergic rhinitis        Physical Exam    Airway    Mallampati score: II  TM Distance: >3 FB  Neck ROM: full     Dental   No notable dental hx     Cardiovascular      Pulmonary  Pulmonary exam normal     Other Findings  post-pubertal.      Anesthesia Plan  ASA Score- 2     Anesthesia Type- IV sedation with anesthesia with ASA Monitors.         Additional Monitors:     Airway Plan:     Comment: Coffee and water driving in. Will wait 2 hrs npo..       Plan Factors-Exercise tolerance (METS): >4 METS.    Chart reviewed.    Patient summary reviewed.    Patient is not a current smoker.              Induction- intravenous.    Postoperative Plan-         Informed Consent- Anesthetic plan and risks discussed with patient.  I personally reviewed this patient with the CRNA. Discussed and agreed on the Anesthesia Plan with the CRNA..      NPO Status:  No vitals data found for the desired time range.

## 2025-02-11 NOTE — H&P
History and Physical -  [facility]  Emma Andres 49 y.o. female MRN: [id number]    HPI: Emma Andres is a 49 y.o. year old female who presents for average risk screening colonoscopy    REVIEW OF SYSTEMS: Per the HPI, and otherwise unremarkable.    Historical Information   Past Medical History:   Diagnosis Date    Broken arm     Hypothyroidism     Migraine      Past Surgical History:   Procedure Laterality Date    FRACTURE SURGERY Left     Arm incision, fracture reset    WISDOM TOOTH EXTRACTION       Social History   Social History     Substance and Sexual Activity   Alcohol Use No     Social History     Substance and Sexual Activity   Drug Use No     Social History     Tobacco Use   Smoking Status Never   Smokeless Tobacco Never     Family History   Problem Relation Age of Onset    No Known Problems Mother     Parkinsonism Father     Migraines Father     Migraines Sister     Brain cancer Sister 29    No Known Problems Maternal Aunt     No Known Problems Paternal Aunt     Substance Abuse Neg Hx     Mental illness Neg Hx        Meds/Allergies       Current Outpatient Medications:     B Complex Vitamins (VITAMIN B COMPLEX PO)    levothyroxine 50 mcg tablet    cholecalciferol (VITAMIN D3) 1,000 units tablet    MAGNESIUM PO    Multiple Vitamins-Minerals (MULTIVITAMIN PO)  No current facility-administered medications for this encounter.    Facility-Administered Medications Ordered in Other Encounters:     sodium chloride 0.9 % infusion, , Intravenous, Continuous PRN, New Bag at 02/11/25 0917    Allergies   Allergen Reactions    Other Anaphylaxis     Action Taken: Wasp and Yellow Jacket;        Objective     /70   Pulse 63   Temp 97.9 °F (36.6 °C) (Temporal)   Resp 18   LMP 01/28/2025 (Exact Date)   SpO2 100%     PHYSICAL EXAM    Gen: NAD AAOx3  Head: Normocephalic, Atraumatic  CV: S1S2 RRR no m/r/g  CHEST: Clear b/l no c/r/w  ABD: soft, +BS NT/ND  EXT: no edema    ASSESSMENT/PLAN:  This is  a 49 y.o. year old female here for average risk screening colonoscopy, and she is stable and optimized for her procedure.

## 2025-02-11 NOTE — ANESTHESIA POSTPROCEDURE EVALUATION
Post-Op Assessment Note    CV Status:  Stable  Pain Score: 0    Pain management: adequate       Mental Status:  Alert and awake   Hydration Status:  Stable   PONV Controlled:  None   Airway Patency:  Patent     Post Op Vitals Reviewed: Yes    No anethesia notable event occurred.    Staff: CRNA           Last Filed PACU Vitals:  Vitals Value Taken Time   Temp     Pulse 62 02/11/25 1017   BP 90/61 02/11/25 1017   Resp 14 02/11/25 1017   SpO2 99 % 02/11/25 1017

## 2025-02-13 ENCOUNTER — RESULTS FOLLOW-UP (OUTPATIENT)
Dept: GASTROENTEROLOGY | Facility: CLINIC | Age: 50
End: 2025-02-13

## 2025-02-13 PROCEDURE — 88305 TISSUE EXAM BY PATHOLOGIST: CPT | Performed by: PATHOLOGY

## 2025-03-17 ENCOUNTER — TELEPHONE (OUTPATIENT)
Age: 50
End: 2025-03-17

## 2025-03-17 NOTE — TELEPHONE ENCOUNTER
Patients GI provider:  Dr. Sweeney    Number to return call: [phone]    Reason for call: Pt calling because she received a bill for 710.00 and when speaking to our billing dept. They say its because a polyp was removed during her colonoscopy procedure and she should reach out to the office. PT wants a callback to discuss.    Scheduled procedure/appointment date if applicable: Apt/procedure colonoscopy

## 2025-04-17 ENCOUNTER — TELEPHONE (OUTPATIENT)
Age: 50
End: 2025-04-17

## 2025-04-17 NOTE — TELEPHONE ENCOUNTER
Patient needs insurance referral     [facility]   [address] [place] [address]    Diag. code- E03.9    NPI # [id number]    D.O.S. 05/12/24

## 2025-04-28 ENCOUNTER — TELEPHONE (OUTPATIENT)
Age: 50
End: 2025-04-28

## 2025-04-28 NOTE — TELEPHONE ENCOUNTER
[facility] endo called asking that copy of insurance referral be faxed over for 5/12 visit. They are having Navinet issues.  In media 4/17.  Please fax to [phone].

## 2025-05-02 ENCOUNTER — VBI (OUTPATIENT)
Dept: ADMINISTRATIVE | Facility: OTHER | Age: 50
End: 2025-05-02

## 2025-05-02 NOTE — TELEPHONE ENCOUNTER
05/02/25 1:01 PM     Chart reviewed for CRC: Colonoscopy ; nothing is submitted to the patient's insurance at this time.     Konstantin Michelle MA   [facility]

## 2025-05-05 NOTE — TELEPHONE ENCOUNTER
Endo office called in state they never received ins referral and is having a hard time pulling it from the website. Requested refax to [phone].